# Patient Record
Sex: FEMALE | Race: WHITE | Employment: OTHER | URBAN - METROPOLITAN AREA
[De-identification: names, ages, dates, MRNs, and addresses within clinical notes are randomized per-mention and may not be internally consistent; named-entity substitution may affect disease eponyms.]

---
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
## 2021-11-05 DIAGNOSIS — N89.8 VAGINAL DISCHARGE: ICD-10-CM

## 2021-11-05 DIAGNOSIS — Z11.59 NEED FOR HEPATITIS C SCREENING TEST: ICD-10-CM

## 2021-11-05 DIAGNOSIS — R10.13 EPIGASTRIC PAIN: ICD-10-CM

## 2021-11-05 DIAGNOSIS — Z11.4 ENCOUNTER FOR SCREENING FOR HIV: ICD-10-CM

## 2021-11-05 DIAGNOSIS — R10.9 ABDOMINAL PAIN, UNSPECIFIED ABDOMINAL LOCATION: ICD-10-CM

## 2021-11-05 LAB
ALBUMIN SERPL-MCNC: 5.2 G/DL (ref 3.5–4.6)
ALP BLD-CCNC: 83 U/L (ref 40–130)
ALT SERPL-CCNC: 9 U/L (ref 0–33)
ANION GAP SERPL CALCULATED.3IONS-SCNC: 11 MEQ/L (ref 9–15)
AST SERPL-CCNC: 21 U/L (ref 0–35)
BASOPHILS ABSOLUTE: 0 K/UL (ref 0–0.2)
BASOPHILS RELATIVE PERCENT: 0.6 %
BILIRUB SERPL-MCNC: 1 MG/DL (ref 0.2–0.7)
BUN BLDV-MCNC: 9 MG/DL (ref 6–20)
CALCIUM SERPL-MCNC: 9.4 MG/DL (ref 8.5–9.9)
CHLORIDE BLD-SCNC: 101 MEQ/L (ref 95–107)
CLUE CELLS: ABNORMAL
CO2: 26 MEQ/L (ref 20–31)
CREAT SERPL-MCNC: 0.64 MG/DL (ref 0.5–0.9)
EOSINOPHILS ABSOLUTE: 0.1 K/UL (ref 0–0.7)
EOSINOPHILS RELATIVE PERCENT: 1 %
GFR AFRICAN AMERICAN: >60
GFR NON-AFRICAN AMERICAN: >60
GLOBULIN: 2.8 G/DL (ref 2.3–3.5)
GLUCOSE BLD-MCNC: 89 MG/DL (ref 70–99)
HCT VFR BLD CALC: 44.4 % (ref 37–47)
HEMOGLOBIN: 14.8 G/DL (ref 12–16)
HEPATITIS C ANTIBODY INTERPRETATION: NORMAL
LIPASE: 35 U/L (ref 12–95)
LYMPHOCYTES ABSOLUTE: 1.9 K/UL (ref 1–4.8)
LYMPHOCYTES RELATIVE PERCENT: 28.4 %
MCH RBC QN AUTO: 31.1 PG (ref 27–31.3)
MCHC RBC AUTO-ENTMCNC: 33.2 % (ref 33–37)
MCV RBC AUTO: 93.6 FL (ref 82–100)
MONOCYTES ABSOLUTE: 0.5 K/UL (ref 0.2–0.8)
MONOCYTES RELATIVE PERCENT: 8 %
NEUTROPHILS ABSOLUTE: 4.2 K/UL (ref 1.4–6.5)
NEUTROPHILS RELATIVE PERCENT: 62 %
PDW BLD-RTO: 13.2 % (ref 11.5–14.5)
PLATELET # BLD: 305 K/UL (ref 130–400)
POTASSIUM SERPL-SCNC: 3.8 MEQ/L (ref 3.4–4.9)
RBC # BLD: 4.74 M/UL (ref 4.2–5.4)
SODIUM BLD-SCNC: 138 MEQ/L (ref 135–144)
TOTAL PROTEIN: 8 G/DL (ref 6.3–8)
TRICHOMONAS PREP: ABNORMAL
TRICHOMONAS VAGINALIS SCREEN: NEGATIVE
WBC # BLD: 6.7 K/UL (ref 4.5–11)
YEAST WET PREP: ABNORMAL

## 2021-11-09 LAB — HIV AG/AB: NONREACTIVE

## 2023-05-15 SDOH — ECONOMIC STABILITY: TRANSPORTATION INSECURITY
IN THE PAST 12 MONTHS, HAS LACK OF TRANSPORTATION KEPT YOU FROM MEETINGS, WORK, OR FROM GETTING THINGS NEEDED FOR DAILY LIVING?: PATIENT DECLINED

## 2023-05-15 SDOH — ECONOMIC STABILITY: INCOME INSECURITY: HOW HARD IS IT FOR YOU TO PAY FOR THE VERY BASICS LIKE FOOD, HOUSING, MEDICAL CARE, AND HEATING?: PATIENT DECLINED

## 2023-05-15 SDOH — ECONOMIC STABILITY: FOOD INSECURITY: WITHIN THE PAST 12 MONTHS, THE FOOD YOU BOUGHT JUST DIDN'T LAST AND YOU DIDN'T HAVE MONEY TO GET MORE.: PATIENT DECLINED

## 2023-05-15 SDOH — ECONOMIC STABILITY: HOUSING INSECURITY
IN THE LAST 12 MONTHS, WAS THERE A TIME WHEN YOU DID NOT HAVE A STEADY PLACE TO SLEEP OR SLEPT IN A SHELTER (INCLUDING NOW)?: PATIENT REFUSED

## 2023-05-15 SDOH — ECONOMIC STABILITY: FOOD INSECURITY: WITHIN THE PAST 12 MONTHS, YOU WORRIED THAT YOUR FOOD WOULD RUN OUT BEFORE YOU GOT MONEY TO BUY MORE.: PATIENT DECLINED

## 2023-05-16 ENCOUNTER — OFFICE VISIT (OUTPATIENT)
Dept: FAMILY MEDICINE CLINIC | Age: 23
End: 2023-05-16
Payer: COMMERCIAL

## 2023-05-16 VITALS
WEIGHT: 122 LBS | HEIGHT: 68 IN | HEART RATE: 76 BPM | DIASTOLIC BLOOD PRESSURE: 60 MMHG | BODY MASS INDEX: 18.49 KG/M2 | SYSTOLIC BLOOD PRESSURE: 120 MMHG

## 2023-05-16 DIAGNOSIS — F90.2 ATTENTION DEFICIT HYPERACTIVITY DISORDER (ADHD), COMBINED TYPE: Primary | ICD-10-CM

## 2023-05-16 DIAGNOSIS — Z20.828 EXPOSURE TO HERPES SIMPLEX VIRUS (HSV): ICD-10-CM

## 2023-05-16 DIAGNOSIS — F43.10 PTSD (POST-TRAUMATIC STRESS DISORDER): ICD-10-CM

## 2023-05-16 DIAGNOSIS — F41.9 ANXIETY: ICD-10-CM

## 2023-05-16 PROCEDURE — 99214 OFFICE O/P EST MOD 30 MIN: CPT | Performed by: NURSE PRACTITIONER

## 2023-05-16 RX ORDER — METHYLPHENIDATE HYDROCHLORIDE 18 MG/1
18 TABLET ORAL DAILY
Qty: 30 TABLET | Refills: 0 | Status: SHIPPED | OUTPATIENT
Start: 2023-05-16 | End: 2023-06-15

## 2023-05-16 RX ORDER — METHYLPHENIDATE HYDROCHLORIDE 18 MG/1
18 TABLET ORAL DAILY
Qty: 30 TABLET | Refills: 0 | Status: SHIPPED | OUTPATIENT
Start: 2023-06-15 | End: 2023-07-15

## 2023-05-16 RX ORDER — ESCITALOPRAM OXALATE 10 MG/1
10 TABLET ORAL DAILY
Qty: 30 TABLET | Refills: 5 | Status: SHIPPED | OUTPATIENT
Start: 2023-05-16

## 2023-05-16 RX ORDER — PRAZOSIN HYDROCHLORIDE 1 MG/1
1 CAPSULE ORAL NIGHTLY
Qty: 30 CAPSULE | Refills: 3 | Status: SHIPPED | OUTPATIENT
Start: 2023-05-16

## 2023-05-16 ASSESSMENT — PATIENT HEALTH QUESTIONNAIRE - PHQ9
7. TROUBLE CONCENTRATING ON THINGS, SUCH AS READING THE NEWSPAPER OR WATCHING TELEVISION: 0
SUM OF ALL RESPONSES TO PHQ QUESTIONS 1-9: 4
9. THOUGHTS THAT YOU WOULD BE BETTER OFF DEAD, OR OF HURTING YOURSELF: 0
2. FEELING DOWN, DEPRESSED OR HOPELESS: 1
10. IF YOU CHECKED OFF ANY PROBLEMS, HOW DIFFICULT HAVE THESE PROBLEMS MADE IT FOR YOU TO DO YOUR WORK, TAKE CARE OF THINGS AT HOME, OR GET ALONG WITH OTHER PEOPLE: 0
4. FEELING TIRED OR HAVING LITTLE ENERGY: 1
8. MOVING OR SPEAKING SO SLOWLY THAT OTHER PEOPLE COULD HAVE NOTICED. OR THE OPPOSITE, BEING SO FIGETY OR RESTLESS THAT YOU HAVE BEEN MOVING AROUND A LOT MORE THAN USUAL: 0
1. LITTLE INTEREST OR PLEASURE IN DOING THINGS: 1
6. FEELING BAD ABOUT YOURSELF - OR THAT YOU ARE A FAILURE OR HAVE LET YOURSELF OR YOUR FAMILY DOWN: 0
SUM OF ALL RESPONSES TO PHQ QUESTIONS 1-9: 4
3. TROUBLE FALLING OR STAYING ASLEEP: 1
SUM OF ALL RESPONSES TO PHQ9 QUESTIONS 1 & 2: 2
SUM OF ALL RESPONSES TO PHQ QUESTIONS 1-9: 4
SUM OF ALL RESPONSES TO PHQ QUESTIONS 1-9: 4
5. POOR APPETITE OR OVEREATING: 0

## 2023-05-16 ASSESSMENT — ENCOUNTER SYMPTOMS
FEELING OF CHOKING: 0
VOMITING: 0
ANAL BLEEDING: 0
COUGH: 0
COLOR CHANGE: 0
EYES NEGATIVE: 1
HYPERVENTILATION: 0
ABDOMINAL PAIN: 0
ALLERGIC/IMMUNOLOGIC NEGATIVE: 1
SWOLLEN GLANDS: 0
SORE THROAT: 0
RESPIRATORY NEGATIVE: 1
TROUBLE SWALLOWING: 0
GASTROINTESTINAL NEGATIVE: 1
CONSTIPATION: 0
RECTAL PAIN: 0
CHANGE IN BOWEL HABIT: 0
DIARRHEA: 0
VOICE CHANGE: 0
BLOOD IN STOOL: 0
VISUAL CHANGE: 0
NAUSEA: 0
SHORTNESS OF BREATH: 0

## 2023-05-16 NOTE — PROGRESS NOTES
Subjective  Ramona Young, 25 y.o. female presents today with:  Chief Complaint   Patient presents with    Check-Up    ADHD        Went to urgent care for STD screen and HSV blood draw indicated she carries both type 1 and 2 she has never had any HSV break outs-  would like labs redrawn    ADHD  This is a new (Diagnosis by psych at [facility]-  see Media for verification) problem. The current episode started more than 1 year ago. The problem occurs constantly. The problem has been unchanged. Pertinent negatives include no abdominal pain, anorexia, arthralgias, change in bowel habit, chest pain, chills, congestion, coughing, diaphoresis, fatigue, fever, headaches, joint swelling, myalgias, nausea, neck pain, numbness, rash, sore throat, swollen glands, urinary symptoms, vertigo, visual change, vomiting or weakness. Nothing aggravates the symptoms. She has tried nothing (CBT - still going but psychologist has suggested satarting medication) for the symptoms. Anxiety  Presents for follow-up visit. Symptoms include decreased concentration, excessive worry, nervous/anxious behavior, obsessions and panic. Patient reports no chest pain, compulsions, confusion, depressed mood, dizziness, dry mouth, feeling of choking, hyperventilation, impotence, insomnia, irritability, malaise, muscle tension, nausea, palpitations, restlessness, shortness of breath or suicidal ideas. Primary symptoms comment: nightmares obsessive thoughts- she does have triggers and PTSD. Symptoms occur most days. The quality of sleep is fair. Nighttime awakenings: depends on the night. Compliance with medications is % (lexapro 5 mg will increase to 10mg). Review of Systems   Constitutional: Negative. Negative for activity change, appetite change, chills, diaphoresis, fatigue, fever, irritability and unexpected weight change. HENT: Negative.   Negative for congestion, dental problem, nosebleeds, sore throat, trouble swallowing and

## 2023-05-18 LAB
HSV1+2 IGG SER IA-ACNC: 0.73 IV
HSV1+2 IGM SER IA-ACNC: 1.41 IV

## 2023-06-26 DIAGNOSIS — F90.2 ATTENTION DEFICIT HYPERACTIVITY DISORDER (ADHD), COMBINED TYPE: ICD-10-CM

## 2023-06-27 RX ORDER — METHYLPHENIDATE HYDROCHLORIDE 18 MG/1
18 TABLET ORAL DAILY
Qty: 30 TABLET | Refills: 0 | Status: SHIPPED | OUTPATIENT
Start: 2023-06-27 | End: 2023-07-27

## 2023-07-10 ENCOUNTER — TELEPHONE (OUTPATIENT)
Dept: OBGYN CLINIC | Age: 23
End: 2023-07-10

## 2023-07-10 NOTE — TELEPHONE ENCOUNTER
Pt calling to schedule IUD removal and replacement; however, she does not have one currently in the office. Pt made aware we can order the dionisio but we need the number to her contraceptive card for the order form before faxing.

## 2023-07-11 ENCOUNTER — OFFICE VISIT (OUTPATIENT)
Dept: FAMILY MEDICINE CLINIC | Age: 23
End: 2023-07-11
Payer: COMMERCIAL

## 2023-07-11 VITALS
BODY MASS INDEX: 19.1 KG/M2 | HEIGHT: 68 IN | DIASTOLIC BLOOD PRESSURE: 70 MMHG | HEART RATE: 74 BPM | WEIGHT: 126 LBS | SYSTOLIC BLOOD PRESSURE: 102 MMHG

## 2023-07-11 DIAGNOSIS — F43.10 PTSD (POST-TRAUMATIC STRESS DISORDER): Primary | ICD-10-CM

## 2023-07-11 DIAGNOSIS — F90.2 ATTENTION DEFICIT HYPERACTIVITY DISORDER (ADHD), COMBINED TYPE: ICD-10-CM

## 2023-07-11 DIAGNOSIS — F41.9 ANXIETY: ICD-10-CM

## 2023-07-11 PROCEDURE — 99214 OFFICE O/P EST MOD 30 MIN: CPT | Performed by: NURSE PRACTITIONER

## 2023-07-11 RX ORDER — ATOMOXETINE 40 MG/1
40 CAPSULE ORAL DAILY
Qty: 7 CAPSULE | Refills: 0 | Status: SHIPPED | OUTPATIENT
Start: 2023-09-09 | End: 2023-09-16

## 2023-07-11 RX ORDER — PRAZOSIN HYDROCHLORIDE 2 MG/1
2 CAPSULE ORAL NIGHTLY
Qty: 30 CAPSULE | Refills: 3 | Status: SHIPPED | OUTPATIENT
Start: 2023-07-11

## 2023-07-11 RX ORDER — ESCITALOPRAM OXALATE 10 MG/1
10 TABLET ORAL DAILY
Qty: 30 TABLET | Refills: 5 | Status: SHIPPED | OUTPATIENT
Start: 2023-07-11

## 2023-07-11 RX ORDER — ATOMOXETINE 80 MG/1
80 CAPSULE ORAL DAILY
Qty: 30 CAPSULE | Refills: 2 | Status: SHIPPED | OUTPATIENT
Start: 2023-09-09 | End: 2023-10-09

## 2023-07-11 ASSESSMENT — ENCOUNTER SYMPTOMS
ABDOMINAL PAIN: 0
TROUBLE SWALLOWING: 0
RECTAL PAIN: 0
CHANGE IN BOWEL HABIT: 0
COUGH: 0
RESPIRATORY NEGATIVE: 1
SORE THROAT: 0
HYPERVENTILATION: 0
VISUAL CHANGE: 0
BLOOD IN STOOL: 0
SHORTNESS OF BREATH: 0
VOMITING: 0
DIARRHEA: 0
GASTROINTESTINAL NEGATIVE: 1
ALLERGIC/IMMUNOLOGIC NEGATIVE: 1
FEELING OF CHOKING: 0
EYES NEGATIVE: 1
SWOLLEN GLANDS: 0
CONSTIPATION: 0
NAUSEA: 0
COLOR CHANGE: 0
VOICE CHANGE: 0
ANAL BLEEDING: 0

## 2023-07-11 NOTE — PROGRESS NOTES
Subjective  Israel Lundberg, 25 y.o. female presents today with:  Chief Complaint   Patient presents with    ADHD    Insomnia    Discuss Medications    Discuss Labs        ADHD  This is a new (Diagnosis by psych at [facility]-  see Media for verification) problem. The current episode started more than 1 year ago. The problem occurs constantly. The problem has been unchanged. Pertinent negatives include no abdominal pain, anorexia, arthralgias, change in bowel habit, chest pain, chills, congestion, coughing, diaphoresis, fatigue, fever, headaches, joint swelling, myalgias, nausea, neck pain, numbness, rash, sore throat, swollen glands, urinary symptoms, vertigo, visual change, vomiting or weakness. Nothing aggravates the symptoms. She has tried nothing (CBT - still going but psychologist has suggested satarting medication) for the symptoms. Anxiety  Presents for follow-up visit. Symptoms include decreased concentration, excessive worry, insomnia, nervous/anxious behavior, obsessions and panic. Patient reports no chest pain, compulsions, confusion, depressed mood, dizziness, dry mouth, feeling of choking, hyperventilation, impotence, irritability, malaise, muscle tension, nausea, palpitations, restlessness, shortness of breath or suicidal ideas. Primary symptoms comment: nightmares obsessive thoughts- she does have triggers and PTSD. Symptoms occur most days. The quality of sleep is fair. Nighttime awakenings: depends on the night. Compliance with medications is % (lexapro 5 mg will increase to 10mg). Review of Systems   Constitutional: Negative. Negative for activity change, appetite change, chills, diaphoresis, fatigue, fever, irritability and unexpected weight change. HENT: Negative. Negative for congestion, dental problem, nosebleeds, sore throat, trouble swallowing and voice change. Eyes: Negative. Negative for visual disturbance. Respiratory: Negative.   Negative for cough and shortness

## 2023-07-31 ENCOUNTER — TELEPHONE (OUTPATIENT)
Dept: FAMILY MEDICINE CLINIC | Age: 23
End: 2023-07-31

## 2023-07-31 DIAGNOSIS — F90.2 ATTENTION DEFICIT HYPERACTIVITY DISORDER (ADHD), COMBINED TYPE: ICD-10-CM

## 2023-07-31 RX ORDER — ATOMOXETINE 80 MG/1
80 CAPSULE ORAL DAILY
Qty: 30 CAPSULE | Refills: 2 | Status: SHIPPED | OUTPATIENT
Start: 2023-09-09 | End: 2023-10-09

## 2023-07-31 RX ORDER — ATOMOXETINE 40 MG/1
40 CAPSULE ORAL DAILY
Qty: 7 CAPSULE | Refills: 0 | Status: SHIPPED | OUTPATIENT
Start: 2023-09-09 | End: 2023-09-16

## 2023-07-31 NOTE — TELEPHONE ENCOUNTER
Tell him or her,  sorry for some reason I sent it to [facility].   I just sent it to Mag[facility] P[facility]

## 2023-07-31 NOTE — TELEPHONE ENCOUNTER
(not on HIPAA) called because patient thought an ADHD medication was being sent in to [facility] on [address]. There is nothing at the pharmacy. (I let the  know we would contact Jessy)    Please advise, thank you.

## 2023-08-02 DIAGNOSIS — F43.10 PTSD (POST-TRAUMATIC STRESS DISORDER): ICD-10-CM

## 2023-08-02 DIAGNOSIS — F90.2 ATTENTION DEFICIT HYPERACTIVITY DISORDER (ADHD), COMBINED TYPE: ICD-10-CM

## 2023-08-02 DIAGNOSIS — F41.9 ANXIETY: ICD-10-CM

## 2023-08-02 NOTE — TELEPHONE ENCOUNTER
patient requesting medication refill.  Please approve or deny this request.    Rx requested:  Requested Prescriptions     Pending Prescriptions Disp Refills    atomoxetine (STRATTERA) 80 MG capsule 30 capsule 2     Sig: Take 1 capsule by mouth daily    escitalopram (LEXAPRO) 10 MG tablet 30 tablet 5     Sig: Take 1 tablet by mouth daily    prazosin (MINIPRESS) 2 MG capsule 30 capsule 3     Sig: Take 1 capsule by mouth nightly         Last Office Visit:   7/11/2023      Next Visit Date:  Future Appointments   Date Time Provider [address] [place] [address]   9/5/2023  3:00 PM Rebecca Johnson [address] [place] [address]

## 2023-08-04 RX ORDER — ATOMOXETINE 80 MG/1
80 CAPSULE ORAL DAILY
Qty: 30 CAPSULE | Refills: 2 | OUTPATIENT
Start: 2023-09-09 | End: 2023-12-08

## 2023-08-04 RX ORDER — PRAZOSIN HYDROCHLORIDE 2 MG/1
2 CAPSULE ORAL NIGHTLY
Qty: 30 CAPSULE | Refills: 5 | Status: SHIPPED | OUTPATIENT
Start: 2023-08-04 | End: 2023-09-05 | Stop reason: SDUPTHER

## 2023-08-04 RX ORDER — ESCITALOPRAM OXALATE 10 MG/1
10 TABLET ORAL DAILY
Qty: 30 TABLET | Refills: 5 | Status: SHIPPED | OUTPATIENT
Start: 2023-08-04 | End: 2023-09-05 | Stop reason: SDUPTHER

## 2023-08-15 DIAGNOSIS — F90.2 ATTENTION DEFICIT HYPERACTIVITY DISORDER (ADHD), COMBINED TYPE: ICD-10-CM

## 2023-08-15 RX ORDER — ATOMOXETINE 40 MG/1
40 CAPSULE ORAL DAILY
Qty: 7 CAPSULE | Refills: 0 | Status: SHIPPED | OUTPATIENT
Start: 2023-08-15 | End: 2023-08-22

## 2023-08-15 RX ORDER — ATOMOXETINE 80 MG/1
80 CAPSULE ORAL DAILY
Qty: 30 CAPSULE | Refills: 2 | Status: SHIPPED | OUTPATIENT
Start: 2023-08-15 | End: 2023-09-14

## 2023-08-15 RX ORDER — ATOMOXETINE 40 MG/1
40 CAPSULE ORAL DAILY
Qty: 7 CAPSULE | Refills: 0 | Status: SHIPPED | OUTPATIENT
Start: 2023-08-15 | End: 2023-08-15 | Stop reason: SDUPTHER

## 2023-08-15 RX ORDER — ATOMOXETINE 80 MG/1
80 CAPSULE ORAL DAILY
Qty: 30 CAPSULE | Refills: 2 | Status: SHIPPED | OUTPATIENT
Start: 2023-08-15 | End: 2023-08-15 | Stop reason: SDUPTHER

## 2023-09-05 ENCOUNTER — OFFICE VISIT (OUTPATIENT)
Dept: FAMILY MEDICINE CLINIC | Age: 23
End: 2023-09-05
Payer: COMMERCIAL

## 2023-09-05 VITALS
DIASTOLIC BLOOD PRESSURE: 70 MMHG | BODY MASS INDEX: 19.1 KG/M2 | WEIGHT: 126 LBS | HEART RATE: 74 BPM | HEIGHT: 68 IN | SYSTOLIC BLOOD PRESSURE: 112 MMHG

## 2023-09-05 DIAGNOSIS — F90.2 ATTENTION DEFICIT HYPERACTIVITY DISORDER (ADHD), COMBINED TYPE: Primary | ICD-10-CM

## 2023-09-05 DIAGNOSIS — F41.9 ANXIETY: ICD-10-CM

## 2023-09-05 DIAGNOSIS — J30.89 ENVIRONMENTAL AND SEASONAL ALLERGIES: ICD-10-CM

## 2023-09-05 DIAGNOSIS — F43.10 PTSD (POST-TRAUMATIC STRESS DISORDER): ICD-10-CM

## 2023-09-05 PROCEDURE — 99214 OFFICE O/P EST MOD 30 MIN: CPT | Performed by: NURSE PRACTITIONER

## 2023-09-05 RX ORDER — ATOMOXETINE 80 MG/1
80 CAPSULE ORAL DAILY
Qty: 30 CAPSULE | Refills: 2 | Status: CANCELLED | OUTPATIENT
Start: 2023-09-05 | End: 2023-10-05

## 2023-09-05 RX ORDER — ATOMOXETINE 80 MG/1
80 CAPSULE ORAL DAILY
Qty: 30 CAPSULE | Refills: 0 | Status: SHIPPED | OUTPATIENT
Start: 2023-09-05 | End: 2023-10-05

## 2023-09-05 RX ORDER — ESCITALOPRAM OXALATE 10 MG/1
10 TABLET ORAL DAILY
Qty: 90 TABLET | Refills: 3 | Status: SHIPPED | OUTPATIENT
Start: 2023-09-05

## 2023-09-05 RX ORDER — PRAZOSIN HYDROCHLORIDE 2 MG/1
2 CAPSULE ORAL NIGHTLY
Qty: 90 CAPSULE | Refills: 3 | Status: SHIPPED | OUTPATIENT
Start: 2023-09-05

## 2023-09-11 ASSESSMENT — ENCOUNTER SYMPTOMS
ANAL BLEEDING: 0
RESPIRATORY NEGATIVE: 1
VOMITING: 0
TROUBLE SWALLOWING: 0
VISUAL CHANGE: 0
SWOLLEN GLANDS: 0
NAUSEA: 0
ALLERGIC/IMMUNOLOGIC NEGATIVE: 1
EYES NEGATIVE: 1
ABDOMINAL PAIN: 0
HYPERVENTILATION: 0
RECTAL PAIN: 0
CONSTIPATION: 0
FEELING OF CHOKING: 0
GASTROINTESTINAL NEGATIVE: 1
SORE THROAT: 0
BLOOD IN STOOL: 0
VOICE CHANGE: 0
SHORTNESS OF BREATH: 0
DIARRHEA: 0
CHANGE IN BOWEL HABIT: 0
COUGH: 0
COLOR CHANGE: 0

## 2023-09-13 ENCOUNTER — PATIENT MESSAGE (OUTPATIENT)
Dept: FAMILY MEDICINE CLINIC | Age: 23
End: 2023-09-13

## 2023-11-02 PROBLEM — H92.10 OTORRHEA: Status: ACTIVE | Noted: 2023-11-02

## 2023-11-15 ENCOUNTER — TELEMEDICINE (OUTPATIENT)
Dept: OBGYN CLINIC | Age: 23
End: 2023-11-15
Payer: COMMERCIAL

## 2023-11-15 DIAGNOSIS — N94.6 DYSMENORRHEA: ICD-10-CM

## 2023-11-15 DIAGNOSIS — Z30.014 ENCOUNTER FOR INITIAL PRESCRIPTION OF INTRAUTERINE CONTRACEPTIVE DEVICE (IUD): ICD-10-CM

## 2023-11-15 DIAGNOSIS — N94.6 DYSMENORRHEA: Primary | ICD-10-CM

## 2023-11-15 DIAGNOSIS — Z30.011 ENCOUNTER FOR INITIAL PRESCRIPTION OF CONTRACEPTIVE PILLS: ICD-10-CM

## 2023-11-15 PROCEDURE — 99214 OFFICE O/P EST MOD 30 MIN: CPT | Performed by: ADVANCED PRACTICE MIDWIFE

## 2023-11-15 RX ORDER — MISOPROSTOL 200 UG/1
TABLET ORAL
Qty: 4 TABLET | Refills: 0 | Status: SHIPPED | OUTPATIENT
Start: 2023-11-15

## 2023-11-15 RX ORDER — DROSPIRENONE AND ETHINYL ESTRADIOL 0.02-3(28)
1 KIT ORAL DAILY
Qty: 28 TABLET | Refills: 2 | Status: SHIPPED | OUTPATIENT
Start: 2023-11-15 | End: 2023-11-16

## 2023-11-15 RX ORDER — IBUPROFEN 800 MG/1
TABLET ORAL
Qty: 15 TABLET | Refills: 0 | Status: SHIPPED | OUTPATIENT
Start: 2023-11-15

## 2023-11-15 ASSESSMENT — ENCOUNTER SYMPTOMS
CONSTIPATION: 0
VOMITING: 0
SHORTNESS OF BREATH: 0
DIARRHEA: 0
ABDOMINAL PAIN: 0
NAUSEA: 0
COUGH: 0

## 2023-11-15 NOTE — PROGRESS NOTES
Constance Pelaez (:  2000) is a Established patient, here for evaluation of the following:    Chief Complaint   Patient presents with    Follow-up     Would like to discuss getting a mirena to replace dionisio         Assessment & Plan   Below is the assessment and plan developed based on review of pertinent history, physical exam, labs, studies, and medications:    1. Dysmenorrhea  -     drospirenone-ethinyl estradiol (BRITTANY) 3-0.02 MG per tablet; Take 1 tablet by mouth daily, Disp-28 tablet, R-2Normal  2. Encounter for initial prescription of intrauterine contraceptive device (IUD)  -     miSOPROStol (CYTOTEC) 200 MCG tablet; Take 2 tablets at bedtime with ibuprofen the night before your IUD insertion. Then take 2 tablets with ibuprofen 1-2 hours before your appointment for IUD insertion. , Disp-4 tablet, R-0Normal  -     ibuprofen (ADVIL;MOTRIN) 800 MG tablet; Take 1 tablet with misoprostol (Cytotec) the night before your IUD insertion. Take 1 tablet with misoprostol 1-2 hours before your appointment for IUD insertion. Then take every 8 hours as needed to relieve cramping., Disp-15 tablet, R-0Normal  3. Encounter for initial prescription of contraceptive pills      Return for IUD Placement. Subjective   Dysmenorrhea  Utilizing hormonal contraception to relieve uncomfortable menstrual symptoms. Dysmenorrhea symptoms have been occurring for greater than 1 year. Dionisio IUD placed over 3 years ago, it was due for removal this past . She is happy with her IUD, would like to continue with the same treatment plan, but would like to transition to a long-lasting IUD, such as Mirena. Discussed starting a combined OCP now to control dysmenorrhea symptoms until her IUD can be replaced. Review of Systems   Respiratory:  Negative for cough and shortness of breath. Gastrointestinal:  Negative for abdominal pain, constipation, diarrhea, nausea and vomiting.    Genitourinary:  Positive for menstrual

## 2023-11-16 DIAGNOSIS — Z30.014 ENCOUNTER FOR INITIAL PRESCRIPTION OF INTRAUTERINE CONTRACEPTIVE DEVICE (IUD): ICD-10-CM

## 2023-11-16 RX ORDER — DROSPIRENONE AND ETHINYL ESTRADIOL 0.02-3(28)
1 KIT ORAL DAILY
Qty: 84 TABLET | Refills: 1 | Status: SHIPPED | OUTPATIENT
Start: 2023-11-16 | End: 2024-02-14

## 2023-11-16 RX ORDER — MISOPROSTOL 200 UG/1
TABLET ORAL
Qty: 4 TABLET | Refills: 0 | OUTPATIENT
Start: 2023-11-16

## 2023-12-12 ENCOUNTER — TELEPHONE (OUTPATIENT)
Dept: OBGYN CLINIC | Age: 23
End: 2023-12-12

## 2023-12-13 NOTE — TELEPHONE ENCOUNTER
Pt should have a 90 day supply of OCP . She should not need a refill yet.   If so, she needs to contact her pharmacy about not giving her the 90 day supply.

## 2024-01-11 ENCOUNTER — PROCEDURE VISIT (OUTPATIENT)
Dept: OBGYN CLINIC | Age: 24
End: 2024-01-11

## 2024-01-11 VITALS
WEIGHT: 122 LBS | BODY MASS INDEX: 18.49 KG/M2 | SYSTOLIC BLOOD PRESSURE: 122 MMHG | HEIGHT: 68 IN | DIASTOLIC BLOOD PRESSURE: 78 MMHG

## 2024-01-11 DIAGNOSIS — Z11.51 SCREENING FOR HUMAN PAPILLOMAVIRUS: ICD-10-CM

## 2024-01-11 DIAGNOSIS — Z32.02 URINE PREGNANCY TEST NEGATIVE: ICD-10-CM

## 2024-01-11 DIAGNOSIS — Z01.419 WOMEN'S ANNUAL ROUTINE GYNECOLOGICAL EXAMINATION: Primary | ICD-10-CM

## 2024-01-11 DIAGNOSIS — Z30.433 ENCOUNTER FOR IUD REMOVAL AND REINSERTION: ICD-10-CM

## 2024-01-11 DIAGNOSIS — N94.6 DYSMENORRHEA: ICD-10-CM

## 2024-01-11 RX ORDER — CYCLOBENZAPRINE HCL 10 MG
10 TABLET ORAL 3 TIMES DAILY PRN
Qty: 40 TABLET | Refills: 1 | Status: SHIPPED | OUTPATIENT
Start: 2024-01-11 | End: 2024-03-11

## 2024-01-11 ASSESSMENT — ENCOUNTER SYMPTOMS
COUGH: 0
TROUBLE SWALLOWING: 0
SHORTNESS OF BREATH: 0
CONSTIPATION: 0
RHINORRHEA: 0
SORE THROAT: 0
VOMITING: 0
DIARRHEA: 0
ABDOMINAL PAIN: 0
VOICE CHANGE: 0
NAUSEA: 0

## 2024-01-11 ASSESSMENT — PATIENT HEALTH QUESTIONNAIRE - PHQ9: DEPRESSION UNABLE TO ASSESS: PT REFUSES

## 2024-01-11 NOTE — PROGRESS NOTES
IUD Removal and New Insertion Procedure Note    Pre-operative Diagnosis:  Dysmenorrhea, IUD at completion of therapy    Post-operative Diagnosis:  Same    Indications:  Same    Procedure Details:   Urine pregnancy test was done and result was negative.  The risks (including infection, bleeding, pain, and uterine perforation) and benefits of the procedure were explained to the patient and Written informed consent was obtained.      A Time Out was performed with patient participation prior to beginning the procedure.    The cervix was visualized, IUD strings.  The existing IUD strings were grasped with ring forceps, the IUD was removed intact, and shown to the patient for confirmation that it had been removed and was intact.      The cervix was then cleansed with Betadine. Uterus sounded to 7 cm. Julia IUD inserted without difficulty. Strings visible and trimmed.     Removal and new insertion procedures were tolerated well.      IUD Information:    Type:  Julia  LOT Number:  [id number]  Expiration Date:  04/30/2025  NDC:  04909-101-70    Condition:  Stable    Complications:  None    Plan:  The patient was advised to call for any fever or for prolonged or severe pain or bleeding. She was advised to use OTC Ibuprofen, Tylenol, or Flexeril as needed for mild to moderate pain and/or cramping.    Follow up:  Return in about 4 weeks (around 2/8/2024) for IUD Follow-up, (virtual visit).    MACKENZIE Myers CNM

## 2024-01-11 NOTE — PROGRESS NOTES
Chief Complaint:     Jessy Arnett is a 23 y.o. female who presents here today for complaints of:      Chief Complaint   Patient presents with    Procedure     Julia removal and replacement      History of Present Illness:     Jesys Arnett is a 23 y.o. female who presents for her annual exam.      Concerns Today:    None, doing well.  Here for IUD removal and reinsertion.    Past Medical History:     Allergies:  Macadamia nut oil and [place] oil  No LMP recorded. (Menstrual status: IUD).  Obstetrical History:       No past medical history on file.  Medications:     Current Outpatient Medications on File Prior to Visit   Medication Sig Dispense Refill    escitalopram (LEXAPRO) 10 MG tablet Take 1 tablet by mouth daily 90 tablet 3    drospirenone-ethinyl estradiol (BRITTANY) 3-0.02 MG per tablet TAKE 1 TABLET BY MOUTH DAILY (Patient not taking: Reported on 2024) 84 tablet 1    miSOPROStol (CYTOTEC) 200 MCG tablet Take 2 tablets at bedtime with ibuprofen the night before your IUD insertion.  Then take 2 tablets with ibuprofen 1-2 hours before your appointment for IUD insertion. 4 tablet 0    ibuprofen (ADVIL;MOTRIN) 800 MG tablet Take 1 tablet with misoprostol (Cytotec) the night before your IUD insertion.  Take 1 tablet with misoprostol 1-2 hours before your appointment for IUD insertion.  Then take every 8 hours as needed to relieve cramping. 15 tablet 0    prazosin (MINIPRESS) 2 MG capsule Take 1 capsule by mouth nightly (Patient not taking: Reported on 2024) 90 capsule 3    atomoxetine (STRATTERA) 80 MG capsule Take 1 capsule by mouth daily 30 capsule 0     No current facility-administered medications on file prior to visit.     Review of Systems:     Review of Systems   Constitutional:  Negative for activity change, appetite change, chills, diaphoresis, fatigue, fever and unexpected weight change.   HENT:  Negative for congestion, postnasal drip, rhinorrhea, sneezing, sore throat, trouble

## 2024-01-16 LAB
HPV HR 12 DNA SPEC QL NAA+PROBE: NOT DETECTED
HPV16 DNA SPEC QL NAA+PROBE: NOT DETECTED
HPV16+18+H RISK 12 DNA SPEC-IMP: NORMAL
HPV18 DNA SPEC QL NAA+PROBE: NOT DETECTED

## 2024-05-20 ENCOUNTER — OFFICE VISIT (OUTPATIENT)
Dept: FAMILY MEDICINE CLINIC | Age: 24
End: 2024-05-20
Payer: COMMERCIAL

## 2024-05-20 VITALS
BODY MASS INDEX: 18.49 KG/M2 | HEIGHT: 68 IN | SYSTOLIC BLOOD PRESSURE: 112 MMHG | DIASTOLIC BLOOD PRESSURE: 60 MMHG | WEIGHT: 122 LBS

## 2024-05-20 DIAGNOSIS — F90.2 ATTENTION DEFICIT HYPERACTIVITY DISORDER (ADHD), COMBINED TYPE: ICD-10-CM

## 2024-05-20 DIAGNOSIS — J30.89 ENVIRONMENTAL AND SEASONAL ALLERGIES: Primary | ICD-10-CM

## 2024-05-20 PROCEDURE — 99214 OFFICE O/P EST MOD 30 MIN: CPT | Performed by: NURSE PRACTITIONER

## 2024-05-20 RX ORDER — MONTELUKAST SODIUM 10 MG/1
10 TABLET ORAL NIGHTLY
Qty: 90 TABLET | Refills: 3 | Status: SHIPPED | OUTPATIENT
Start: 2024-05-20

## 2024-05-20 RX ORDER — TRIAMCINOLONE ACETONIDE 40 MG/ML
40 INJECTION, SUSPENSION INTRA-ARTICULAR; INTRAMUSCULAR ONCE
Status: SHIPPED | OUTPATIENT
Start: 2024-05-20

## 2024-05-20 RX ORDER — DEXTROAMPHETAMINE SACCHARATE, AMPHETAMINE ASPARTATE MONOHYDRATE, DEXTROAMPHETAMINE SULFATE AND AMPHETAMINE SULFATE 2.5; 2.5; 2.5; 2.5 MG/1; MG/1; MG/1; MG/1
10 CAPSULE, EXTENDED RELEASE ORAL DAILY
Qty: 90 CAPSULE | Refills: 0 | Status: SHIPPED | OUTPATIENT
Start: 2024-05-20 | End: 2024-08-18

## 2024-05-20 SDOH — ECONOMIC STABILITY: INCOME INSECURITY: HOW HARD IS IT FOR YOU TO PAY FOR THE VERY BASICS LIKE FOOD, HOUSING, MEDICAL CARE, AND HEATING?: NOT HARD AT ALL

## 2024-05-20 SDOH — ECONOMIC STABILITY: FOOD INSECURITY: WITHIN THE PAST 12 MONTHS, THE FOOD YOU BOUGHT JUST DIDN'T LAST AND YOU DIDN'T HAVE MONEY TO GET MORE.: NEVER TRUE

## 2024-05-20 SDOH — ECONOMIC STABILITY: HOUSING INSECURITY
IN THE LAST 12 MONTHS, WAS THERE A TIME WHEN YOU DID NOT HAVE A STEADY PLACE TO SLEEP OR SLEPT IN A SHELTER (INCLUDING NOW)?: NO

## 2024-05-20 SDOH — ECONOMIC STABILITY: FOOD INSECURITY: WITHIN THE PAST 12 MONTHS, YOU WORRIED THAT YOUR FOOD WOULD RUN OUT BEFORE YOU GOT MONEY TO BUY MORE.: NEVER TRUE

## 2024-05-20 ASSESSMENT — PATIENT HEALTH QUESTIONNAIRE - PHQ9
1. LITTLE INTEREST OR PLEASURE IN DOING THINGS: SEVERAL DAYS
2. FEELING DOWN, DEPRESSED OR HOPELESS: SEVERAL DAYS
SUM OF ALL RESPONSES TO PHQ QUESTIONS 1-9: 2
SUM OF ALL RESPONSES TO PHQ9 QUESTIONS 1 & 2: 2
SUM OF ALL RESPONSES TO PHQ QUESTIONS 1-9: 2

## 2024-05-21 ENCOUNTER — NURSE ONLY (OUTPATIENT)
Dept: FAMILY MEDICINE CLINIC | Age: 24
End: 2024-05-21
Payer: COMMERCIAL

## 2024-05-21 DIAGNOSIS — J30.89 ENVIRONMENTAL AND SEASONAL ALLERGIES: Primary | ICD-10-CM

## 2024-05-21 PROCEDURE — 96372 THER/PROPH/DIAG INJ SC/IM: CPT | Performed by: NURSE PRACTITIONER

## 2024-05-21 RX ORDER — TRIAMCINOLONE ACETONIDE 40 MG/ML
40 INJECTION, SUSPENSION INTRA-ARTICULAR; INTRAMUSCULAR ONCE
Status: COMPLETED | OUTPATIENT
Start: 2024-05-21 | End: 2024-05-21

## 2024-05-21 RX ADMIN — TRIAMCINOLONE ACETONIDE 40 MG: 40 INJECTION, SUSPENSION INTRA-ARTICULAR; INTRAMUSCULAR at 11:40

## 2024-05-28 ASSESSMENT — ENCOUNTER SYMPTOMS
NAUSEA: 0
HYPERVENTILATION: 0
FEELING OF CHOKING: 0
EYES NEGATIVE: 1
RECTAL PAIN: 0
DIARRHEA: 0
ALLERGIC/IMMUNOLOGIC NEGATIVE: 1
CONSTIPATION: 0
ABDOMINAL PAIN: 0
BLOOD IN STOOL: 0
VOMITING: 0
VOICE CHANGE: 0
TROUBLE SWALLOWING: 0
CHANGE IN BOWEL HABIT: 0
ANAL BLEEDING: 0
SHORTNESS OF BREATH: 0
COUGH: 0
SORE THROAT: 0
GASTROINTESTINAL NEGATIVE: 1
VISUAL CHANGE: 0
COLOR CHANGE: 0
RESPIRATORY NEGATIVE: 1
SWOLLEN GLANDS: 0

## 2024-05-29 NOTE — PROGRESS NOTES
subscore is 6.      Motor: No tremor, atrophy, abnormal muscle tone or seizure activity.      Deep Tendon Reflexes: Reflexes are normal and symmetric.   Psychiatric:         Mood and Affect: Mood normal.         Speech: Speech normal.         Behavior: Behavior normal. Behavior is cooperative.     Assessment & Plan   Jessy was seen today for adhd.    Diagnoses and all orders for this visit:    Environmental and seasonal allergies  -     montelukast (SINGULAIR) 10 MG tablet; Take 1 tablet by mouth nightly  -     triamcinolone acetonide (KENALOG-40) injection 40 mg    Attention deficit hyperactivity disorder (ADHD), combined type  -     amphetamine-dextroamphetamine (ADDERALL XR) 10 MG extended release capsule; Take 1 capsule by mouth daily for 90 days. Max Daily Amount: 10 mg      No orders of the defined types were placed in this encounter.      Orders Placed This Encounter   Medications    montelukast (SINGULAIR) 10 MG tablet     Sig: Take 1 tablet by mouth nightly     Dispense:  90 tablet     Refill:  3    amphetamine-dextroamphetamine (ADDERALL XR) 10 MG extended release capsule     Sig: Take 1 capsule by mouth daily for 90 days. Max Daily Amount: 10 mg     Dispense:  90 capsule     Refill:  0    triamcinolone acetonide (KENALOG-40) injection 40 mg     Medications Discontinued During This Encounter   Medication Reason    atomoxetine (STRATTERA) 80 MG capsule LIST CLEANUP    drospirenone-ethinyl estradiol (BRITTANY) 3-0.02 MG per tablet LIST CLEANUP    prazosin (MINIPRESS) 2 MG capsule LIST CLEANUP    miSOPROStol (CYTOTEC) 200 MCG tablet LIST CLEANUP    escitalopram (LEXAPRO) 10 MG tablet LIST CLEANUP    ibuprofen (ADVIL;MOTRIN) 800 MG tablet LIST CLEANUP     Return in about 3 months (around 8/20/2024).      Reviewed with the patient: current clinical status, medications, activities and diet.     Side effects, adverse effects of the medication prescribed today, as well as treatment plan/ rationale and result

## 2024-07-15 ENCOUNTER — PATIENT MESSAGE (OUTPATIENT)
Dept: FAMILY MEDICINE CLINIC | Age: 24
End: 2024-07-15

## 2024-07-23 ENCOUNTER — APPOINTMENT (OUTPATIENT)
Dept: ULTRASOUND IMAGING | Age: 24
End: 2024-07-23
Payer: COMMERCIAL

## 2024-07-23 ENCOUNTER — HOSPITAL ENCOUNTER (EMERGENCY)
Age: 24
Discharge: HOME | End: 2024-07-23
Attending: EMERGENCY MEDICINE
Payer: COMMERCIAL

## 2024-07-23 ENCOUNTER — APPOINTMENT (OUTPATIENT)
Dept: CT IMAGING | Age: 24
End: 2024-07-23
Payer: COMMERCIAL

## 2024-07-23 VITALS
RESPIRATION RATE: 15 BRPM | OXYGEN SATURATION: 98 % | TEMPERATURE: 97.7 F | WEIGHT: 121.91 LBS | BODY MASS INDEX: 18.54 KG/M2 | HEART RATE: 73 BPM | SYSTOLIC BLOOD PRESSURE: 107 MMHG | DIASTOLIC BLOOD PRESSURE: 58 MMHG

## 2024-07-23 DIAGNOSIS — N83.209 RUPTURED OVARIAN CYST: Primary | ICD-10-CM

## 2024-07-23 LAB
ALBUMIN SERPL-MCNC: 5 G/DL (ref 3.5–4.6)
ALP SERPL-CCNC: 68 U/L (ref 40–130)
ALT SERPL-CCNC: 10 U/L (ref 0–33)
ANION GAP SERPL CALCULATED.3IONS-SCNC: 13 MEQ/L (ref 9–15)
AST SERPL-CCNC: 17 U/L (ref 0–35)
BASOPHILS # BLD: 0 K/UL (ref 0–0.2)
BASOPHILS NFR BLD: 0.5 %
BILIRUB SERPL-MCNC: 0.7 MG/DL (ref 0.2–0.7)
BILIRUB UR QL STRIP: NEGATIVE
BUN SERPL-MCNC: 10 MG/DL (ref 6–20)
CALCIUM SERPL-MCNC: 9.4 MG/DL (ref 8.5–9.9)
CHLORIDE SERPL-SCNC: 104 MEQ/L (ref 95–107)
CLARITY UR: CLEAR
CLUE CELLS VAG QL WET PREP: ABNORMAL
CO2 SERPL-SCNC: 26 MEQ/L (ref 20–31)
COLOR UR: YELLOW
CREAT SERPL-MCNC: 0.63 MG/DL (ref 0.5–0.9)
EOSINOPHIL # BLD: 0.1 K/UL (ref 0–0.7)
EOSINOPHIL NFR BLD: 0.8 %
ERYTHROCYTE [DISTWIDTH] IN BLOOD BY AUTOMATED COUNT: 12.2 % (ref 11.5–14.5)
GLOBULIN SER CALC-MCNC: 2.7 G/DL (ref 2.3–3.5)
GLUCOSE SERPL-MCNC: 101 MG/DL (ref 70–99)
GLUCOSE UR STRIP-MCNC: NEGATIVE MG/DL
HCG, URINE, POC: NEGATIVE
HCT VFR BLD AUTO: 43.9 % (ref 37–47)
HGB BLD-MCNC: 14.3 G/DL (ref 12–16)
HGB UR QL STRIP: NEGATIVE
KETONES UR STRIP-MCNC: NEGATIVE MG/DL
LEUKOCYTE ESTERASE UR QL STRIP: NEGATIVE
LYMPHOCYTES # BLD: 2.1 K/UL (ref 1–4.8)
LYMPHOCYTES NFR BLD: 24.3 %
Lab: NORMAL
MCH RBC QN AUTO: 30.7 PG (ref 27–31.3)
MCHC RBC AUTO-ENTMCNC: 32.6 % (ref 33–37)
MCV RBC AUTO: 94.2 FL (ref 79.4–94.8)
MONOCYTES # BLD: 0.7 K/UL (ref 0.2–0.8)
MONOCYTES NFR BLD: 8.1 %
NEGATIVE QC PASS/FAIL: NORMAL
NEUTROPHILS # BLD: 5.7 K/UL (ref 1.4–6.5)
NEUTS SEG NFR BLD: 66 %
NITRITE UR QL STRIP: NEGATIVE
PERFORMED ON: NORMAL
PH UR STRIP: 7.5 [PH] (ref 5–9)
PLATELET # BLD AUTO: 319 K/UL (ref 130–400)
POC CREATININE WHOLE BLOOD: 0.7
POC CREATININE: 0.7 MG/DL (ref 0.6–1.2)
POC SAMPLE TYPE: NORMAL
POSITIVE QC PASS/FAIL: NORMAL
POTASSIUM SERPL-SCNC: 3.8 MEQ/L (ref 3.4–4.9)
PROT SERPL-MCNC: 7.7 G/DL (ref 6.3–8)
PROT UR STRIP-MCNC: NEGATIVE MG/DL
RBC # BLD AUTO: 4.66 M/UL (ref 4.2–5.4)
SODIUM SERPL-SCNC: 143 MEQ/L (ref 135–144)
SP GR UR STRIP: 1 (ref 1–1.03)
T VAGINALIS VAG QL WET PREP: ABNORMAL
TRICHOMONAS VAGINALIS SCREEN: NEGATIVE
URINE REFLEX TO CULTURE: NORMAL
UROBILINOGEN UR STRIP-ACNC: 0.2 E.U./DL
WBC # BLD AUTO: 8.6 K/UL (ref 4.8–10.8)
YEAST VAG QL WET PREP: ABNORMAL

## 2024-07-23 PROCEDURE — 96375 TX/PRO/DX INJ NEW DRUG ADDON: CPT

## 2024-07-23 PROCEDURE — 87808 TRICHOMONAS ASSAY W/OPTIC: CPT

## 2024-07-23 PROCEDURE — 74177 CT ABD & PELVIS W/CONTRAST: CPT

## 2024-07-23 PROCEDURE — 85025 COMPLETE CBC W/AUTO DIFF WBC: CPT

## 2024-07-23 PROCEDURE — 96374 THER/PROPH/DIAG INJ IV PUSH: CPT

## 2024-07-23 PROCEDURE — 6360000002 HC RX W HCPCS: Performed by: EMERGENCY MEDICINE

## 2024-07-23 PROCEDURE — 6360000004 HC RX CONTRAST MEDICATION: Performed by: EMERGENCY MEDICINE

## 2024-07-23 PROCEDURE — 99285 EMERGENCY DEPT VISIT HI MDM: CPT

## 2024-07-23 PROCEDURE — 36415 COLL VENOUS BLD VENIPUNCTURE: CPT

## 2024-07-23 PROCEDURE — 93975 VASCULAR STUDY: CPT

## 2024-07-23 PROCEDURE — 87210 SMEAR WET MOUNT SALINE/INK: CPT

## 2024-07-23 PROCEDURE — 76830 TRANSVAGINAL US NON-OB: CPT

## 2024-07-23 PROCEDURE — 80053 COMPREHEN METABOLIC PANEL: CPT

## 2024-07-23 PROCEDURE — 2580000003 HC RX 258: Performed by: EMERGENCY MEDICINE

## 2024-07-23 PROCEDURE — 81003 URINALYSIS AUTO W/O SCOPE: CPT

## 2024-07-23 RX ORDER — KETOROLAC TROMETHAMINE 10 MG/1
10 TABLET, FILM COATED ORAL EVERY 6 HOURS PRN
Qty: 20 TABLET | Refills: 0 | Status: SHIPPED | OUTPATIENT
Start: 2024-07-23

## 2024-07-23 RX ORDER — ONDANSETRON 2 MG/ML
4 INJECTION INTRAMUSCULAR; INTRAVENOUS ONCE
Status: COMPLETED | OUTPATIENT
Start: 2024-07-23 | End: 2024-07-23

## 2024-07-23 RX ORDER — 0.9 % SODIUM CHLORIDE 0.9 %
1000 INTRAVENOUS SOLUTION INTRAVENOUS ONCE
Status: COMPLETED | OUTPATIENT
Start: 2024-07-23 | End: 2024-07-23

## 2024-07-23 RX ORDER — KETOROLAC TROMETHAMINE 15 MG/ML
15 INJECTION, SOLUTION INTRAMUSCULAR; INTRAVENOUS ONCE
Status: DISCONTINUED | OUTPATIENT
Start: 2024-07-23 | End: 2024-07-23 | Stop reason: HOSPADM

## 2024-07-23 RX ORDER — METRONIDAZOLE 500 MG/1
500 TABLET ORAL 2 TIMES DAILY
Qty: 14 TABLET | Refills: 0 | Status: SHIPPED | OUTPATIENT
Start: 2024-07-23 | End: 2024-07-30

## 2024-07-23 RX ORDER — MORPHINE SULFATE 4 MG/ML
4 INJECTION, SOLUTION INTRAMUSCULAR; INTRAVENOUS ONCE
Status: COMPLETED | OUTPATIENT
Start: 2024-07-23 | End: 2024-07-23

## 2024-07-23 RX ADMIN — SODIUM CHLORIDE 1000 ML: 9 INJECTION, SOLUTION INTRAVENOUS at 02:34

## 2024-07-23 RX ADMIN — MORPHINE SULFATE 4 MG: 4 INJECTION, SOLUTION INTRAMUSCULAR; INTRAVENOUS at 02:35

## 2024-07-23 RX ADMIN — ONDANSETRON 4 MG: 2 INJECTION INTRAMUSCULAR; INTRAVENOUS at 02:35

## 2024-07-23 RX ADMIN — IOPAMIDOL 75 ML: 755 INJECTION, SOLUTION INTRAVENOUS at 03:50

## 2024-07-23 ASSESSMENT — ENCOUNTER SYMPTOMS
SHORTNESS OF BREATH: 0
VOMITING: 0

## 2024-07-23 NOTE — ED PROVIDER NOTES
[facility] ED  EMERGENCY DEPARTMENT ENCOUNTER      Pt Name: Jessy Arnett  MRN: [id number]  Birthdate 2000  Date of evaluation: 7/23/2024  Provider: Enrique Miguel MD  5:43 AM    CHIEF COMPLAINT       Chief Complaint   Patient presents with    Abdominal Pain     Lower abdominal pain         HISTORY OF PRESENT ILLNESS    Jessy Arnett is a 24 y.o. female who presents to the emergency department via private vehicle for evaluation of lower abdominal pain.  She states that she gets cramping quite frequently.  That she was feeling fine earlier in the day in the evening.  She just had sexual intercourse with her partner and at the end of that she began to have lower abdominal/pelvic sharp pain, more on the right and in the suprapubic area.  Associated nausea.  No fever, syncope, neck or jaw pain, chest pain or difficulty breathing, upper abdominal pain, flank pain, vaginal discharge.    HPI  Chart review notes history of ADHD, tonsillectomy  Nursing Notes were reviewed.    REVIEW OF SYSTEMS       Review of Systems   Constitutional:  Negative for fever.        Abdominal pelvic pain after intercourse   Respiratory:  Negative for shortness of breath.    Cardiovascular:  Negative for chest pain.   Gastrointestinal:  Negative for vomiting.   Genitourinary:  Negative for dysuria, flank pain, genital sores, vaginal bleeding and vaginal discharge.   Musculoskeletal:  Negative for neck pain.   Neurological:  Negative for syncope.   All other systems reviewed and are negative.      Except as noted above the remainder of the review of systems was reviewed and negative.       PAST MEDICAL HISTORY   History reviewed. No pertinent past medical history.      SURGICAL HISTORY       Past Surgical History:   Procedure Laterality Date    INNER EAR SURGERY      TONSILLECTOMY AND ADENOIDECTOMY      TYMPANOSTOMY TUBE PLACEMENT      WISDOM TOOTH EXTRACTION           CURRENT MEDICATIONS       Previous Medications

## 2024-07-23 NOTE — ED TRIAGE NOTES
Patient to ED due to lower abdominal pain and cramping. Patient states pain started after intercourse. Patient is alert and oriented x4. Ambulatory to triage.

## 2024-07-31 ENCOUNTER — OFFICE VISIT (OUTPATIENT)
Dept: OBGYN CLINIC | Age: 24
End: 2024-07-31
Payer: COMMERCIAL

## 2024-07-31 VITALS
DIASTOLIC BLOOD PRESSURE: 72 MMHG | WEIGHT: 120 LBS | BODY MASS INDEX: 18.25 KG/M2 | HEART RATE: 93 BPM | SYSTOLIC BLOOD PRESSURE: 102 MMHG

## 2024-07-31 DIAGNOSIS — R10.2 PELVIC PAIN: Primary | ICD-10-CM

## 2024-07-31 DIAGNOSIS — N83.201 RIGHT OVARIAN CYST: ICD-10-CM

## 2024-07-31 PROCEDURE — 99213 OFFICE O/P EST LOW 20 MIN: CPT | Performed by: ADVANCED PRACTICE MIDWIFE

## 2024-07-31 RX ORDER — PRAZOSIN HYDROCHLORIDE 2 MG/1
2 CAPSULE ORAL NIGHTLY
COMMUNITY
Start: 2024-05-26

## 2024-07-31 RX ORDER — ESCITALOPRAM OXALATE 10 MG/1
10 TABLET ORAL DAILY
COMMUNITY
Start: 2024-05-26

## 2024-07-31 ASSESSMENT — ENCOUNTER SYMPTOMS
NAUSEA: 0
ABDOMINAL PAIN: 0
CONSTIPATION: 0
VOMITING: 0
DIARRHEA: 0
COUGH: 0
SHORTNESS OF BREATH: 0

## 2024-07-31 NOTE — PROGRESS NOTES
SUBJECTIVE:  Jessy Arnett is a 24 y.o. female who presents here today for complaints of:      Chief Complaint   Patient presents with    ED Follow-up     Was seen at ED 7/23 , she was having some pelvic pains after recent intercourse and had some ovarian cysts, the pelvic pains have improved      Pelvic Pain, Right Ovarian Cyst  Presented to the ER on 7/23/24 following onset of severe right lower pelvic pain.  While in the ER pelvic imaging was done which identified a right ovarian cyst.  Pain has now fully resolved, feels like things have returned to normal.    Review of Systems   Respiratory:  Negative for cough and shortness of breath.    Gastrointestinal:  Negative for abdominal pain, constipation, diarrhea, nausea and vomiting.   Genitourinary:  Negative for difficulty urinating, dysuria, menstrual problem, pelvic pain, vaginal bleeding and vaginal discharge.   All other systems reviewed and are negative.    OBJECTIVE:  Vitals:  /72   Pulse 93   Wt 54.4 kg (120 lb)   BMI 18.25 kg/m²     Physical Exam  Appearance:  Normal appearance  Cardiovascular:  Normal rate, Capillary refill less than 2 seconds  Pulmonary:  Normal effort, no distress  Abdominal:  No tenderness  MS:  No Swelling, No dependent edema  Skin:  Warm, dry  Neuro:  Alert and oriented x3, reflexes normal.  Psychiatric:  Normal mood and behavior    ASSESSMENT & PLAN:   Diagnosis Orders   1. Pelvic pain        2. Right ovarian cyst  US NON OB TRANSVAGINAL    US PELVIS COMPLETE    US DUP ABD PEL RETRO SCROT COMPLETE          Pelvic Pain, Right Ovarian Cyst  Follow-up US in 3 months to ensure ovarian cyst has resolved    Return for discuss pelvic US results, (virtual visit).    MACKENZIE Myers CNM

## 2024-08-12 RX ORDER — ESCITALOPRAM OXALATE 10 MG/1
10 TABLET ORAL DAILY
Qty: 30 TABLET | Refills: 0 | Status: SHIPPED | OUTPATIENT
Start: 2024-08-12 | End: 2024-09-11

## 2024-08-12 NOTE — TELEPHONE ENCOUNTER
Requesting medication refill. Please approve or deny this request.    Rx requested:  Requested Prescriptions     Pending Prescriptions Disp Refills    escitalopram (LEXAPRO) 10 MG tablet 90 tablet 0     Sig: Take 1 tablet by mouth daily         Last Office Visit:   5/20/2024      Next Visit Date:  Future Appointments   Date Time Provider Department Center   8/20/2024  2:15 PM Thu Jay, APRN - CNP [facility] DEP

## 2024-08-29 ENCOUNTER — OFFICE VISIT (OUTPATIENT)
Dept: FAMILY MEDICINE CLINIC | Age: 24
End: 2024-08-29

## 2024-08-29 VITALS
SYSTOLIC BLOOD PRESSURE: 112 MMHG | WEIGHT: 117 LBS | DIASTOLIC BLOOD PRESSURE: 62 MMHG | HEIGHT: 68 IN | HEART RATE: 86 BPM | BODY MASS INDEX: 17.73 KG/M2

## 2024-08-29 DIAGNOSIS — F90.2 ATTENTION DEFICIT HYPERACTIVITY DISORDER (ADHD), COMBINED TYPE: ICD-10-CM

## 2024-08-29 DIAGNOSIS — Z00.00 ANNUAL PHYSICAL EXAM: Primary | ICD-10-CM

## 2024-08-29 DIAGNOSIS — Z00.00 ANNUAL PHYSICAL EXAM: ICD-10-CM

## 2024-08-29 DIAGNOSIS — F41.1 GAD (GENERALIZED ANXIETY DISORDER): ICD-10-CM

## 2024-08-29 LAB
ALBUMIN SERPL-MCNC: 4.5 G/DL (ref 3.5–4.6)
ALP SERPL-CCNC: 57 U/L (ref 40–130)
ALT SERPL-CCNC: <5 U/L (ref 0–33)
ANION GAP SERPL CALCULATED.3IONS-SCNC: 10 MEQ/L (ref 9–15)
AST SERPL-CCNC: 15 U/L (ref 0–35)
BASOPHILS # BLD: 0 K/UL (ref 0–0.2)
BASOPHILS NFR BLD: 0.6 %
BILIRUB SERPL-MCNC: 1.2 MG/DL (ref 0.2–0.7)
BUN SERPL-MCNC: 13 MG/DL (ref 6–20)
CALCIUM SERPL-MCNC: 9.3 MG/DL (ref 8.5–9.9)
CHLORIDE SERPL-SCNC: 103 MEQ/L (ref 95–107)
CHOLEST SERPL-MCNC: 122 MG/DL (ref 0–199)
CO2 SERPL-SCNC: 26 MEQ/L (ref 20–31)
CREAT SERPL-MCNC: 0.73 MG/DL (ref 0.5–0.9)
EOSINOPHIL # BLD: 0.1 K/UL (ref 0–0.7)
EOSINOPHIL NFR BLD: 0.9 %
ERYTHROCYTE [DISTWIDTH] IN BLOOD BY AUTOMATED COUNT: 12.2 % (ref 11.5–14.5)
GLOBULIN SER CALC-MCNC: 2.7 G/DL (ref 2.3–3.5)
GLUCOSE SERPL-MCNC: 82 MG/DL (ref 70–99)
HCT VFR BLD AUTO: 43.2 % (ref 37–47)
HDLC SERPL-MCNC: 45 MG/DL (ref 40–59)
HGB BLD-MCNC: 14.3 G/DL (ref 12–16)
LDLC SERPL CALC-MCNC: 68 MG/DL (ref 0–129)
LYMPHOCYTES # BLD: 1.9 K/UL (ref 1–4.8)
LYMPHOCYTES NFR BLD: 28.1 %
MCH RBC QN AUTO: 30.8 PG (ref 27–31.3)
MCHC RBC AUTO-ENTMCNC: 33.1 % (ref 33–37)
MCV RBC AUTO: 92.9 FL (ref 79.4–94.8)
MONOCYTES # BLD: 0.5 K/UL (ref 0.2–0.8)
MONOCYTES NFR BLD: 7.8 %
NEUTROPHILS # BLD: 4.1 K/UL (ref 1.4–6.5)
NEUTS SEG NFR BLD: 62.3 %
PLATELET # BLD AUTO: 307 K/UL (ref 130–400)
POTASSIUM SERPL-SCNC: 4.6 MEQ/L (ref 3.4–4.9)
PROT SERPL-MCNC: 7.2 G/DL (ref 6.3–8)
RBC # BLD AUTO: 4.65 M/UL (ref 4.2–5.4)
SODIUM SERPL-SCNC: 139 MEQ/L (ref 135–144)
TRIGL SERPL-MCNC: 45 MG/DL (ref 0–150)
WBC # BLD AUTO: 6.7 K/UL (ref 4.8–10.8)

## 2024-08-29 RX ORDER — DEXTROAMPHETAMINE SACCHARATE, AMPHETAMINE ASPARTATE MONOHYDRATE, DEXTROAMPHETAMINE SULFATE AND AMPHETAMINE SULFATE 2.5; 2.5; 2.5; 2.5 MG/1; MG/1; MG/1; MG/1
10 CAPSULE, EXTENDED RELEASE ORAL DAILY
Qty: 90 CAPSULE | Refills: 0 | Status: SHIPPED | OUTPATIENT
Start: 2024-08-29 | End: 2024-11-27

## 2024-08-29 RX ORDER — ESCITALOPRAM OXALATE 10 MG/1
10 TABLET ORAL DAILY
Qty: 90 TABLET | Refills: 0 | Status: SHIPPED | OUTPATIENT
Start: 2024-08-29 | End: 2024-11-27

## 2024-08-31 LAB
6MAM UR QL: NOT DETECTED
7-AMINOCLONAZEPAM: NOT DETECTED
ALPHA-OH-ALPRAZOLAM: NOT DETECTED
ALPHA-OH-MIDAZOLAM, URINE: NOT DETECTED
ALPRAZOLAM: NOT DETECTED
AMPHET UR QL SCN: PRESENT
BARBITURATES: NEGATIVE
BENZOYLECGONINE: NEGATIVE
BUPRENORPHINE: NOT DETECTED
CARISOPRODOL UR QL: NEGATIVE
CLONAZEPAM UR QL: NOT DETECTED
CODEINE: NOT DETECTED
CREAT UR-MCNC: 259.7 MG/DL (ref 20–400)
DIAZEPAM: NOT DETECTED
ETHYL GLUCURONIDE: NEGATIVE
FENTANYL UR QL: NOT DETECTED
GABAPENTIN: NOT DETECTED
HYDROCODONE UR QL: NOT DETECTED
HYDROMORPHONE: NOT DETECTED
LORAZEPAM UR QL: NOT DETECTED
MARIJUANA METABOLITE: NORMAL
MDA: NOT DETECTED
MDEA: NOT DETECTED
MDMA UR QL: NOT DETECTED
MEPERIDINE: NOT DETECTED
METHADONE: NEGATIVE
METHAMPHETAMINE: NOT DETECTED
METHYLPHENIDATE: NOT DETECTED
MIDAZOLAM UR QL SCN: NOT DETECTED
MORPHINE: NOT DETECTED
NALOXONE: NOT DETECTED
NORBUPRENORPHINE, FREE: NOT DETECTED
NORDIAZEPAM: NOT DETECTED
NORFENTANYL: NOT DETECTED
NORHYDROCODONE, URINE: NOT DETECTED
NOROXYCODONE: NOT DETECTED
NOROXYMORPHONE, URINE: NOT DETECTED
OXAZEPAM UR QL: NOT DETECTED
OXYCODONE UR QL: NOT DETECTED
OXYMORPHONE UR QL: NOT DETECTED
PAIN MANAGEMENT DRUG PANEL: NORMAL
PATHOLOGY STUDY: NORMAL
PCP: NEGATIVE
PHENTERMINE: NOT DETECTED
PREGABALIN: NOT DETECTED
TAPENTADOL, URINE: NOT DETECTED
TAPENTADOL-O-SULFATE, URINE: NOT DETECTED
TEMAZEPAM: NOT DETECTED
TRAMADOL: NEGATIVE
ZOLPIDEM: NOT DETECTED

## 2024-09-05 ASSESSMENT — ENCOUNTER SYMPTOMS
VOICE CHANGE: 0
CONSTIPATION: 0
RECTAL PAIN: 0
NAUSEA: 0
COLOR CHANGE: 0
ANAL BLEEDING: 0
FEELING OF CHOKING: 0
SORE THROAT: 0
BLOOD IN STOOL: 0
TROUBLE SWALLOWING: 0
RESPIRATORY NEGATIVE: 1
ALLERGIC/IMMUNOLOGIC NEGATIVE: 1
EYES NEGATIVE: 1
HYPERVENTILATION: 0
SHORTNESS OF BREATH: 0
DIARRHEA: 0
CHANGE IN BOWEL HABIT: 0
SWOLLEN GLANDS: 0
COUGH: 0
VOMITING: 0
ABDOMINAL PAIN: 0
GASTROINTESTINAL NEGATIVE: 1
VISUAL CHANGE: 0

## 2024-09-06 NOTE — PROGRESS NOTES
Subjective  Jessy Arnett, 24 y.o. female presents today with:  Chief Complaint   Patient presents with    3 Month Follow-Up    ADHD        Annual exam- no concerns    Needs csm    ADHD  This is a chronic (Diagnosis by psych at [facility]-  see Media for verification) problem. The current episode started more than 1 year ago. The problem occurs constantly. The problem has been unchanged. Pertinent negatives include no abdominal pain, anorexia, arthralgias, change in bowel habit, chest pain, chills, congestion, coughing, diaphoresis, fatigue, fever, headaches, joint swelling, myalgias, nausea, neck pain, numbness, rash, sore throat, swollen glands, urinary symptoms, vertigo, visual change, vomiting or weakness. Nothing aggravates the symptoms. Treatments tried: CBT first, then no stim stratera, but made her not sleep, not eat didnt feel good.   Anxiety  Presents for follow-up visit. Patient reports no chest pain, compulsions, confusion, decreased concentration, depressed mood, dizziness, dry mouth, excessive worry, feeling of choking, hyperventilation, impotence, insomnia, irritability, malaise, muscle tension, nausea, nervous/anxious behavior, obsessions, palpitations, panic, restlessness, shortness of breath or suicidal ideas. Primary symptoms comment: nightmares obsessive thoughts- she does have triggers and PTSD. Symptoms occur rarely. Current severity: well controlled. The quality of sleep is fair. Nighttime awakenings: depends on the night.     Compliance with medications is % (lexapro 5 mg will increase to 10mg).         Review of Systems   Constitutional: Negative.  Negative for activity change, appetite change, chills, diaphoresis, fatigue, fever, irritability and unexpected weight change.   HENT: Negative.  Negative for congestion, dental problem, nosebleeds, sore throat, trouble swallowing and voice change.    Eyes: Negative.  Negative for visual disturbance.   Respiratory: Negative.  Negative for

## 2024-09-11 ENCOUNTER — TELEPHONE (OUTPATIENT)
Dept: FAMILY MEDICINE CLINIC | Age: 24
End: 2024-09-11

## 2024-09-11 DIAGNOSIS — J30.89 ENVIRONMENTAL AND SEASONAL ALLERGIES: Primary | ICD-10-CM

## 2024-09-11 RX ORDER — TRIAMCINOLONE ACETONIDE 40 MG/ML
40 INJECTION, SUSPENSION INTRA-ARTICULAR; INTRAMUSCULAR ONCE
Status: SHIPPED | OUTPATIENT
Start: 2024-09-12

## 2024-11-12 ENCOUNTER — HOSPITAL ENCOUNTER (OUTPATIENT)
Dept: ULTRASOUND IMAGING | Age: 24
Discharge: HOME | End: 2024-11-14
Payer: COMMERCIAL

## 2024-11-12 DIAGNOSIS — N83.201 RIGHT OVARIAN CYST: ICD-10-CM

## 2024-11-12 PROCEDURE — 93975 VASCULAR STUDY: CPT

## 2024-11-12 PROCEDURE — 76830 TRANSVAGINAL US NON-OB: CPT

## 2024-11-12 PROCEDURE — 76856 US EXAM PELVIC COMPLETE: CPT

## 2024-11-13 ENCOUNTER — OFFICE VISIT (OUTPATIENT)
Dept: OBGYN CLINIC | Age: 24
End: 2024-11-13
Payer: COMMERCIAL

## 2024-11-13 VITALS
HEART RATE: 63 BPM | DIASTOLIC BLOOD PRESSURE: 58 MMHG | BODY MASS INDEX: 18.4 KG/M2 | WEIGHT: 121 LBS | SYSTOLIC BLOOD PRESSURE: 116 MMHG

## 2024-11-13 DIAGNOSIS — R10.2 PELVIC PAIN: Primary | ICD-10-CM

## 2024-11-13 PROCEDURE — 99212 OFFICE O/P EST SF 10 MIN: CPT | Performed by: ADVANCED PRACTICE MIDWIFE

## 2024-11-13 ASSESSMENT — ENCOUNTER SYMPTOMS
SHORTNESS OF BREATH: 0
COUGH: 0
NAUSEA: 0
CONSTIPATION: 0
DIARRHEA: 0
ABDOMINAL PAIN: 0
VOMITING: 0

## 2024-11-13 NOTE — PROGRESS NOTES
baseline.   Psychiatric:         Mood and Affect: Mood normal.         Behavior: Behavior normal.       ASSESSMENT & PLAN:   Diagnosis Orders   1. Pelvic pain            Pelvic Pain  Follow-up as needed    Return if symptoms worsen or fail to improve.    MACKENZIE Myers CNM

## 2024-12-02 ENCOUNTER — OFFICE VISIT (OUTPATIENT)
Dept: FAMILY MEDICINE CLINIC | Age: 24
End: 2024-12-02
Payer: COMMERCIAL

## 2024-12-02 VITALS
WEIGHT: 119 LBS | HEIGHT: 68 IN | SYSTOLIC BLOOD PRESSURE: 120 MMHG | DIASTOLIC BLOOD PRESSURE: 70 MMHG | BODY MASS INDEX: 18.04 KG/M2

## 2024-12-02 DIAGNOSIS — F41.1 GAD (GENERALIZED ANXIETY DISORDER): ICD-10-CM

## 2024-12-02 DIAGNOSIS — J45.990 MILD EXERCISE-INDUCED ASTHMA: ICD-10-CM

## 2024-12-02 DIAGNOSIS — F90.2 ATTENTION DEFICIT HYPERACTIVITY DISORDER (ADHD), COMBINED TYPE: Primary | ICD-10-CM

## 2024-12-02 PROCEDURE — 99214 OFFICE O/P EST MOD 30 MIN: CPT | Performed by: NURSE PRACTITIONER

## 2024-12-02 RX ORDER — ALBUTEROL SULFATE 90 UG/1
2 INHALANT RESPIRATORY (INHALATION) 4 TIMES DAILY PRN
Qty: 18 G | Refills: 0 | Status: SHIPPED | OUTPATIENT
Start: 2024-12-02

## 2024-12-02 RX ORDER — DEXTROAMPHETAMINE SACCHARATE, AMPHETAMINE ASPARTATE MONOHYDRATE, DEXTROAMPHETAMINE SULFATE AND AMPHETAMINE SULFATE 2.5; 2.5; 2.5; 2.5 MG/1; MG/1; MG/1; MG/1
10 CAPSULE, EXTENDED RELEASE ORAL DAILY
Qty: 90 CAPSULE | Refills: 0 | Status: CANCELLED | OUTPATIENT
Start: 2024-12-02 | End: 2025-03-02

## 2024-12-02 RX ORDER — DEXTROAMPHETAMINE SACCHARATE, AMPHETAMINE ASPARTATE, DEXTROAMPHETAMINE SULFATE AND AMPHETAMINE SULFATE 2.5; 2.5; 2.5; 2.5 MG/1; MG/1; MG/1; MG/1
10 TABLET ORAL 2 TIMES DAILY
Qty: 180 TABLET | Refills: 0 | Status: SHIPPED | OUTPATIENT
Start: 2024-12-02 | End: 2025-03-02

## 2024-12-02 RX ORDER — ESCITALOPRAM OXALATE 10 MG/1
10 TABLET ORAL DAILY
Qty: 90 TABLET | Refills: 3 | Status: SHIPPED | OUTPATIENT
Start: 2024-12-02 | End: 2025-03-02

## 2024-12-02 ASSESSMENT — ENCOUNTER SYMPTOMS
GASTROINTESTINAL NEGATIVE: 1
VOICE CHANGE: 0
RESPIRATORY NEGATIVE: 1
ALLERGIC/IMMUNOLOGIC NEGATIVE: 1
COLOR CHANGE: 0
CONSTIPATION: 0
DIARRHEA: 0
ABDOMINAL PAIN: 0
SHORTNESS OF BREATH: 0
TROUBLE SWALLOWING: 0
ANAL BLEEDING: 0
BLOOD IN STOOL: 0
RECTAL PAIN: 0
EYES NEGATIVE: 1

## 2024-12-02 NOTE — PROGRESS NOTES
expresses understanding and desires to proceed.    Close follow up to evaluate treatment results and for coordination of care.  I have reviewed the patient's medical history in detail and updated the computerized patient record.    Please note, this report has been partially produced using speech recognition software  and may cause  and /or contain errors related to that system including grammar, punctuation and spelling as well as words and phrases that may seem inappropriate. If there are questions or concerns please feel free to contact me to clarify.    On this date 12/2/2024 I have spent 35 minutes reviewing previous notes, test results and face to face with the patient discussing the diagnosis and importance of compliance with the treatment plan as well as documenting on the day of the visit.    The patient (or guardian, if applicable) and other individuals in attendance with the patient were advised that Artificial Intelligence will be utilized during this visit to record, process the conversation to generate a clinical note and to support improvement of the AI technology. The patient (or guardian, if applicable) and other individuals in attendance at the appointment consented to the use of AI, including the recording.      An electronic signature was used to authenticate this note.    --Thu Jay, APRN - CNP

## 2025-03-03 ENCOUNTER — OFFICE VISIT (OUTPATIENT)
Age: 25
End: 2025-03-03
Payer: COMMERCIAL

## 2025-03-03 VITALS
DIASTOLIC BLOOD PRESSURE: 62 MMHG | WEIGHT: 117 LBS | BODY MASS INDEX: 17.73 KG/M2 | HEIGHT: 68 IN | SYSTOLIC BLOOD PRESSURE: 120 MMHG

## 2025-03-03 DIAGNOSIS — F41.1 GAD (GENERALIZED ANXIETY DISORDER): ICD-10-CM

## 2025-03-03 DIAGNOSIS — S76.212A STRAIN OF LEFT GROIN: ICD-10-CM

## 2025-03-03 DIAGNOSIS — F90.2 ATTENTION DEFICIT HYPERACTIVITY DISORDER (ADHD), COMBINED TYPE: Primary | ICD-10-CM

## 2025-03-03 DIAGNOSIS — F33.42 RECURRENT MAJOR DEPRESSIVE DISORDER, IN FULL REMISSION: ICD-10-CM

## 2025-03-03 PROCEDURE — 99214 OFFICE O/P EST MOD 30 MIN: CPT | Performed by: NURSE PRACTITIONER

## 2025-03-03 RX ORDER — ESCITALOPRAM OXALATE 10 MG/1
10 TABLET ORAL DAILY
Qty: 90 TABLET | Refills: 3 | Status: SHIPPED | OUTPATIENT
Start: 2025-03-03 | End: 2025-06-01

## 2025-03-03 RX ORDER — DEXTROAMPHETAMINE SACCHARATE, AMPHETAMINE ASPARTATE, DEXTROAMPHETAMINE SULFATE AND AMPHETAMINE SULFATE 2.5; 2.5; 2.5; 2.5 MG/1; MG/1; MG/1; MG/1
10 TABLET ORAL 2 TIMES DAILY
Qty: 180 TABLET | Refills: 0 | Status: SHIPPED | OUTPATIENT
Start: 2025-03-03 | End: 2025-06-01

## 2025-03-03 SDOH — ECONOMIC STABILITY: FOOD INSECURITY: WITHIN THE PAST 12 MONTHS, YOU WORRIED THAT YOUR FOOD WOULD RUN OUT BEFORE YOU GOT MONEY TO BUY MORE.: NEVER TRUE

## 2025-03-03 SDOH — ECONOMIC STABILITY: INCOME INSECURITY: IN THE LAST 12 MONTHS, WAS THERE A TIME WHEN YOU WERE NOT ABLE TO PAY THE MORTGAGE OR RENT ON TIME?: NO

## 2025-03-03 SDOH — ECONOMIC STABILITY: FOOD INSECURITY: WITHIN THE PAST 12 MONTHS, THE FOOD YOU BOUGHT JUST DIDN'T LAST AND YOU DIDN'T HAVE MONEY TO GET MORE.: NEVER TRUE

## 2025-03-03 SDOH — ECONOMIC STABILITY: TRANSPORTATION INSECURITY
IN THE PAST 12 MONTHS, HAS THE LACK OF TRANSPORTATION KEPT YOU FROM MEDICAL APPOINTMENTS OR FROM GETTING MEDICATIONS?: NO

## 2025-03-03 SDOH — ECONOMIC STABILITY: TRANSPORTATION INSECURITY
IN THE PAST 12 MONTHS, HAS LACK OF TRANSPORTATION KEPT YOU FROM MEETINGS, WORK, OR FROM GETTING THINGS NEEDED FOR DAILY LIVING?: NO

## 2025-03-03 ASSESSMENT — PATIENT HEALTH QUESTIONNAIRE - PHQ9
1. LITTLE INTEREST OR PLEASURE IN DOING THINGS: SEVERAL DAYS
SUM OF ALL RESPONSES TO PHQ QUESTIONS 1-9: 2
SUM OF ALL RESPONSES TO PHQ9 QUESTIONS 1 & 2: 2
2. FEELING DOWN, DEPRESSED OR HOPELESS: SEVERAL DAYS
SUM OF ALL RESPONSES TO PHQ QUESTIONS 1-9: 2
SUM OF ALL RESPONSES TO PHQ QUESTIONS 1-9: 2
1. LITTLE INTEREST OR PLEASURE IN DOING THINGS: SEVERAL DAYS
SUM OF ALL RESPONSES TO PHQ QUESTIONS 1-9: 2
2. FEELING DOWN, DEPRESSED OR HOPELESS: SEVERAL DAYS

## 2025-03-10 ENCOUNTER — HOSPITAL ENCOUNTER (OUTPATIENT)
Dept: PHYSICAL THERAPY | Age: 25
Setting detail: THERAPIES SERIES
Discharge: HOME | End: 2025-03-10
Payer: COMMERCIAL

## 2025-03-10 PROCEDURE — 97161 PT EVAL LOW COMPLEX 20 MIN: CPT

## 2025-03-10 PROCEDURE — 97110 THERAPEUTIC EXERCISES: CPT

## 2025-03-10 NOTE — PLAN OF CARE
Physical Therapy Evaluation/Plan of Care   [facility] OUT PATIENT THERAPY AND REHABILITATION  [address]  [facility] [address]  Dept: [phone]  Dept Fax: [phone]  Loc: [phone]    Physical Therapy: Initial Evaluation    General Information    Patient: Jessy Arnett (24 y.o.     female)   Examination Date: 03/10/2025   :  2000 ;    Confirmed: Yes MRN: [id number]  CSN: [id number]   Insurance: Payor: [facility] / Plan: [facility] EM[employer]OYEES / Product Type: *No Product type* /   Insurance ID: [id number] - (Commercial)  PT Insurance Information: [facility] Secondary Insurance (if applicable):     Referring Physician: Thu Jay APRN - CNP       Visits to Date/Visits Approved:     No Show/Cancelled Appts: 0  0     Medical Diagnosis: Strain of left groin [S76.212A] strain Lt groin  Diagnosis: strain Lt groin   Treatment Diagnosis: Lt groin pain, decreased Lt LE flexibility, decreased core strength      SUBJECTIVE:     Onset date:   Onset Date:  (1.5 years ago)    Subjective/ Mechanism of Injury:   Subjective: Pt reports onset of symptoms 1.5 years ago. Noticed increased discomfort with cartwheels, yoga. Pain initiated Rt groin, but radiates to medial aspect of knee. occ tingling, but not sig. c/o some weakness. Denies recent falls. Relief with tiger balm, ice/ heat, massage, stretching, avoiding aggravating activities. Pt reports increased stiffness today in neck and shoulders due to stress levels.    Precautions/Contraindications/Restrictions: none           Changes in Bowel/Bladder Function: no  Saddle Anesthesia: no  Unexplained Weight Loss: no  Unrelenting Night Pain:  night sweats, bad dreams  Sudden Weakness/Falls: no    Interventions for current problem: ice, heat , tiger balm      Pain:   Current: 0/10   Best: 0/10   Worst:6/10 (occurs with yoga, cartwheels).  6/10 neck and shoulder pain due to stress.    Symptom Type / Quality: aching, sharp,

## 2025-03-12 ENCOUNTER — HOSPITAL ENCOUNTER (OUTPATIENT)
Dept: PHYSICAL THERAPY | Age: 25
Setting detail: THERAPIES SERIES
Discharge: HOME | End: 2025-03-12
Payer: COMMERCIAL

## 2025-03-12 PROCEDURE — 97110 THERAPEUTIC EXERCISES: CPT

## 2025-03-12 NOTE — PROGRESS NOTES
[facility]  Outpatient Physical Therapy    Treatment Note        Date: 3/12/2025  Patient: Jessy Arnett  : 2000   Confirmed: Yes  MRN: [id number]  Referring Provider: Thu Jay APR*   Secondary Referring Provider (If applicable):     Medical Diagnosis: Strain of left groin [S76.212A]    Treatment Diagnosis: Lt groin pain, decreased Lt LE flexibility, decreased core strength    Visit Information:  Insurance: Payor: R / Plan: [facility] EMPLOYEES / Product Type: *No Product type* /   PT Visit Information  PT Insurance Information: UMR  Total # of Visits Approved: 20  Total # of Visits to Date: 2  Progress Note Counter:     Subjective Information:  Subjective: Pt denies pain at arrival. Pt notes forgetting to mention on eval, frequent shin splint pain upon prolonged periods of walking [place] amusement park, festivals etc.  HEP Compliance:  [x] Good [] Fair [] Poor [] Reports not doing due to:    Pain Screening  Patient Currently in Pain: Denies    Treatment:  Exercises:  Exercises  Exercise 2: LTR (minimal to no stretch felt, pt demo's full range)  Exercise 3: hip adduction stretch, standing 15\"x3 Lt  Exercise 4: TFL stretch standing w/ 15\"x3  Exercise 5: hams str standing 3x30\" on stool  Exercise 6: 1/2 kneel hip flexor stretch 3x30\"  Exercise 7: Sci fit L2.0 5 min  Exercise 8: hip series: S/L circles x10 ea b/l  Exercise 9: Quadruped: firehydrants x10, hip exts x10 , bird dog x10, w/ 3-5\" iso hold  Exercise 10: * core strength  Exercise 20: HEP:  Treatment Reasoning  Limitations addressed: Mobility, Strength, Flexibility, Activity tolerance  Functional ability(s) targeted: Tolerance to age appropriate activities    Objective Measures:     Assessment:   Body Structures, Functions, Activity Limitations Requiring Skilled Therapeutic Intervention: Decreased ROM, Decreased strength, Decreased coordination, Decreased posture, Increased pain  Assessment: Initiated PT program

## 2025-03-17 ENCOUNTER — HOSPITAL ENCOUNTER (OUTPATIENT)
Dept: PHYSICAL THERAPY | Age: 25
Setting detail: THERAPIES SERIES
Discharge: HOME | End: 2025-03-17
Payer: COMMERCIAL

## 2025-03-17 PROCEDURE — 97110 THERAPEUTIC EXERCISES: CPT

## 2025-03-17 ASSESSMENT — PAIN DESCRIPTION - ORIENTATION: ORIENTATION: LEFT

## 2025-03-17 ASSESSMENT — PAIN SCALES - GENERAL: PAINLEVEL_OUTOF10: 2

## 2025-03-17 NOTE — PROGRESS NOTES
[facility]  Outpatient Physical Therapy    Treatment Note        Date: 3/17/2025  Patient: Jessy Arnett  : 2000   Confirmed: Yes  MRN: [id number]  Referring Provider: Thu Jay APR*   Secondary Referring Provider (If applicable):     Medical Diagnosis: Strain of left groin [S76.212A]    Treatment Diagnosis: Lt groin pain, decreased Lt LE flexibility, decreased core strength    Visit Information:  Insurance: Payor: R / Plan: [facility] EMPLOYEES / Product Type: *No Product type* /   PT Visit Information  PT Insurance Information: UMR  Total # of Visits Approved: 20  Total # of Visits to Date: 3  Progress Note Counter: 3/12    Subjective Information:  Subjective: Pt states lots of walking over weekend with minimal soreness.  HEP Compliance:  [x] Good [] Fair [] Poor [] Reports not doing due to:    Pain Screening  Patient Currently in Pain: Yes  Pain Assessment: 0-10  Pain Level: 2  Pain Location:  (groin)  Pain Orientation: Left    Treatment:  Exercises:  Exercises  Exercise 3: hip adduction stretch 1/2 kneel 15\"x3 Lt  Exercise 4: hip flexor stretch 1/2 kneel w/ 15\"x3  Exercise 5: hams str standing 3x30\" on stool  Exercise 6: 1/2 kneel hip flexor stretch 3x30\"  Exercise 8: hip series: S/L circles x10 ea b/l  Exercise 9: Quadruped: firehydrants x10, hip exts (knee ext and knee bent x10 , bird dog x10, w/ 3-5\" iso hold  Exercise 10: core strength: anti rotation walkouts GTB 5 sec/5, anti rotation press x10  Exercise 20: HEP:  Treatment Reasoning  Limitations addressed: Mobility, Strength, Flexibility, Activity tolerance  Functional ability(s) targeted: Tolerance to age appropriate activities    Manual:   Manual Therapy  Muscle Energy: * contract/ relax hams  Manual Traction: * leg pull  Soft Tissue Mobilizaton: foam roller TFL, hams, adductor  Other: * consider IDN TFL and adductor  Treatment Reasoning  Limitations addressed: Tissue extensibility, Painful

## 2025-03-19 ENCOUNTER — HOSPITAL ENCOUNTER (OUTPATIENT)
Dept: PHYSICAL THERAPY | Age: 25
Setting detail: THERAPIES SERIES
Discharge: HOME | End: 2025-03-19
Payer: COMMERCIAL

## 2025-03-19 PROCEDURE — 97110 THERAPEUTIC EXERCISES: CPT

## 2025-03-19 NOTE — PROGRESS NOTES
[facility]  Outpatient Physical Therapy    Treatment Note        Date: 3/19/2025  Patient: Jessy Arnett  : 2000   Confirmed: Yes  MRN: [id number]  Referring Provider: Thu Jay APR*   Secondary Referring Provider (If applicable):     Medical Diagnosis: Strain of left groin [S76.212A]    Treatment Diagnosis: Lt groin pain, decreased Lt LE flexibility, decreased core strength    Visit Information:  Insurance: Payor: R / Plan: [employer] EMPLOYEES / Product Type: *No Product type* /   PT Visit Information  PT Insurance Information: UMR  Total # of Visits Approved: 20  Total # of Visits to Date: 4  Progress Note Counter:     Subjective Information:  Subjective: Pt states lots of walking over weekend with minimal soreness.  HEP Compliance:  [x] Good [] Fair [] Poor [] Reports not doing due to:    Pain Screening  Patient Currently in Pain: Denies    Treatment:  Exercises:  Exercises  Exercise 2: standing hip abd/add w/ YTB x10  Exercise 3: hip adduction stretch 1/2 kneel 15\"x3 Lt, 1/2 kneel with trunk rotation 3/30 sec  Exercise 6: 1/2 kneel hip flexor stretch 3x30\"  Exercise 7: NuStep L3x5 min LE only  Exercise 9: Quadruped: firehydrants x10 w/ YTB, bird dog 5 sec/10  Exercise 10: core strength: anti rotation walkouts GTB 5 sec/5, anti rotation press x10  Exercise 20: HEP:  Treatment Reasoning  Limitations addressed: Mobility, Strength, Flexibility, Activity tolerance  Functional ability(s) targeted: Tolerance to age appropriate activities      *Indicates exercise, modality, or manual techniques to be initiated when appropriate    Objective Measures:        STG 1 Current Status:: 3/19- compliant with HEP     Assessment:   Body Structures, Functions, Activity Limitations Requiring Skilled Therapeutic Intervention: Decreased ROM, Decreased strength, Decreased coordination, Decreased posture, Increased pain  Assessment: Pt continues to report no pain, pt stating has not

## 2025-03-26 ENCOUNTER — HOSPITAL ENCOUNTER (OUTPATIENT)
Dept: PHYSICAL THERAPY | Age: 25
Setting detail: THERAPIES SERIES
Discharge: HOME | End: 2025-03-26
Payer: COMMERCIAL

## 2025-03-26 PROCEDURE — 97110 THERAPEUTIC EXERCISES: CPT

## 2025-03-26 NOTE — PROGRESS NOTES
[facility]  Outpatient Physical Therapy    Treatment Note        Date: 3/26/2025  Patient: Jessy Arnett  : 2000   Confirmed: Yes  MRN: [id number]  Referring Provider: Thu Jay APR*   Secondary Referring Provider (If applicable):     Medical Diagnosis: Strain of left groin [S76.212A]    Treatment Diagnosis: Lt groin pain, decreased Lt LE flexibility, decreased core strength    Visit Information:  Insurance: Payor: R / Plan: [facility] EMPLOYEES / Product Type: *No Product type* /   PT Visit Information  PT Insurance Information: UMR  Total # of Visits Approved: 20  Total # of Visits to Date: 5  Progress Note Counter:     Subjective Information:  Subjective: Pt states getting back from [place] with LB and abdominal soreness.  pt states doing alot of dancing and cartwheels that could have caused it.  Pt states no groin pain  HEP Compliance:  [x] Good [] Fair [] Poor [] Reports not doing due to:    Pain Screening  Patient Currently in Pain: Denies    Treatment:  Exercises:  Exercises  Exercise 2: standing hip abd/add w/ YTB x10  Exercise 6: standing kneel hip flexor stretch 3x30\"  Exercise 7: NuStep L4x5 min LE only  Exercise 8: objective measures taken  Exercise 9: Quadruped: bird dog 3 sec/10 w/ YTB, plank bird dog 5 sec/3  Exercise 10: core strength: trunk rotation w/ GTB x10  Exercise 20: HEP:  Treatment Reasoning  Limitations addressed: Mobility, Strength, Flexibility, Activity tolerance  Functional ability(s) targeted: Tolerance to age appropriate activities      *Indicates exercise, modality, or manual techniques to be initiated when appropriate    Objective Measures:      Strength RLE  R Hip Flexion: 5/5  R Hip Extension: 5/5  R Hip ABduction: 5/5  R Hip ADduction: 5/5  R Hip Internal Rotation: 4+/5  R Hip External Rotation: 5/5  R Knee Flexion: 5/5  R Knee Extension: 5/5  R Ankle Dorsiflexion: 5/5  Strength LLE  L Hip Flexion: 4+/5  L Hip Extension: 4+/5  L

## 2025-03-26 NOTE — PROGRESS NOTES
PHYSICAL THERAPY PLAN OF CARE   [facility]      [address], [address]   [place] [address]     Ph: [phone] Fax: [phone]      [] Certification  [] Recertification []  Plan of Care  [] Progress Note [x] Discharge      Referring Provider: Thu Jay APR*      From:  Abby Orlando, PT  Patient: Jessy Arnett (24 y.o. female) : 2000 Date: 2025   Medical Diagnosis: Strain of left groin [S76.212A]    Treatment Diagnosis: Lt groin pain, decreased Lt LE flexibility, decreased core strength    Progress Report Period from: 3/10/2025  to 3/26/2025    Visits to Date: 5 No Show:  0 Cancelled Appts:  0    OBJECTIVE:   Short Term Goals - Time Frame for Short Term Goals: 3 wks    Goals Current/Discharge status  Status   Short Term Goal 1: Independent with HEP to promote home management of symptoms  STG 1 Current Status:: 3/26- compliant with HEP   Met   Short Term Goal 2: Report 25% reduction in symptoms with improved tolerance to yoga  STG 2 Current Status:: 3/26- pt reports 100% reduction in symptoms with ability to perform yoga without any symptoms   Met     Long Term Goals - Time Frame for Long Term Goals : 5 wks  Goals Current/ Discharge status Status   Long Term Goal 1: Improve bilateral LE strength 5/5 to return to previous activities without modifications or pain LTG 1 Current Status:: 3/26- see strength section  Strength RLE  R Hip Flexion: 5/5  R Hip Extension: 5/5  R Hip ABduction: 5/5  R Hip ADduction: 5/5  R Hip Internal Rotation: 4+/5  R Hip External Rotation: 5/5  R Knee Flexion: 5/5  R Knee Extension: 5/5  R Ankle Dorsiflexion: 5/5  Strength LLE  L Hip Flexion: 4+/5  L Hip Extension: 4+/5  L Hip ABduction: 4+/5  L Hip ADduction: 4+/5  L Hip Internal Rotation: 4+/5  L Hip External Rotation: 5/5  L Knee Flexion: 5/5  L Knee Extension: 5/5  L Ankle Dorsiflexion: 5/5   Partially met   Long Term Goal 2: Improve bilateral SLR >/= 70 to improve tolerance to

## 2025-03-31 ENCOUNTER — APPOINTMENT (OUTPATIENT)
Dept: PHYSICAL THERAPY | Age: 25
End: 2025-03-31
Payer: COMMERCIAL

## 2025-04-28 DIAGNOSIS — J30.89 ENVIRONMENTAL AND SEASONAL ALLERGIES: ICD-10-CM

## 2025-04-28 RX ORDER — MONTELUKAST SODIUM 10 MG/1
10 TABLET ORAL NIGHTLY
Qty: 90 TABLET | Refills: 3 | Status: CANCELLED | OUTPATIENT
Start: 2025-04-28

## 2025-04-28 NOTE — TELEPHONE ENCOUNTER
[facility] calling in requesting singulair at patient request (on list cleanup)  They also faxed over request on 4/9 and 4/14    LOV 3/3/25  Next office visit 6/3/25    [facility] confirmed    Phone [phone]

## 2025-05-05 ENCOUNTER — PATIENT MESSAGE (OUTPATIENT)
Age: 25
End: 2025-05-05

## 2025-05-05 NOTE — TELEPHONE ENCOUNTER
Called patient and left VM to call office to schedule an appointment, or suggested to go to walkin.  Last seen 3/2025

## 2025-06-03 ENCOUNTER — OFFICE VISIT (OUTPATIENT)
Age: 25
End: 2025-06-03
Payer: COMMERCIAL

## 2025-06-03 VITALS
BODY MASS INDEX: 18.49 KG/M2 | HEIGHT: 68 IN | WEIGHT: 122 LBS | SYSTOLIC BLOOD PRESSURE: 120 MMHG | DIASTOLIC BLOOD PRESSURE: 70 MMHG

## 2025-06-03 DIAGNOSIS — S86.892A SHIN SPLINTS OF BOTH LOWER EXTREMITIES: ICD-10-CM

## 2025-06-03 DIAGNOSIS — S86.891A SHIN SPLINTS OF BOTH LOWER EXTREMITIES: ICD-10-CM

## 2025-06-03 DIAGNOSIS — F41.9 ANXIETY: ICD-10-CM

## 2025-06-03 DIAGNOSIS — J30.89 ENVIRONMENTAL AND SEASONAL ALLERGIES: ICD-10-CM

## 2025-06-03 DIAGNOSIS — F90.2 ATTENTION DEFICIT HYPERACTIVITY DISORDER (ADHD), COMBINED TYPE: Primary | ICD-10-CM

## 2025-06-03 PROCEDURE — 99214 OFFICE O/P EST MOD 30 MIN: CPT | Performed by: NURSE PRACTITIONER

## 2025-06-03 PROCEDURE — 96372 THER/PROPH/DIAG INJ SC/IM: CPT | Performed by: NURSE PRACTITIONER

## 2025-06-03 RX ORDER — DEXTROAMPHETAMINE SACCHARATE, AMPHETAMINE ASPARTATE, DEXTROAMPHETAMINE SULFATE AND AMPHETAMINE SULFATE 2.5; 2.5; 2.5; 2.5 MG/1; MG/1; MG/1; MG/1
10 TABLET ORAL DAILY
Qty: 180 TABLET | Refills: 0 | Status: SHIPPED | OUTPATIENT
Start: 2025-06-03 | End: 2025-06-05 | Stop reason: SDUPTHER

## 2025-06-03 RX ORDER — TRIAMCINOLONE ACETONIDE 40 MG/ML
40 INJECTION, SUSPENSION INTRA-ARTICULAR; INTRAMUSCULAR ONCE
Status: COMPLETED | OUTPATIENT
Start: 2025-06-03 | End: 2025-06-03

## 2025-06-03 RX ORDER — DEXTROAMPHETAMINE SACCHARATE, AMPHETAMINE ASPARTATE, DEXTROAMPHETAMINE SULFATE AND AMPHETAMINE SULFATE 2.5; 2.5; 2.5; 2.5 MG/1; MG/1; MG/1; MG/1
10 TABLET ORAL 2 TIMES DAILY
Qty: 180 TABLET | Refills: 0 | Status: CANCELLED | OUTPATIENT
Start: 2025-06-03 | End: 2025-09-01

## 2025-06-03 RX ORDER — MONTELUKAST SODIUM 10 MG/1
10 TABLET ORAL DAILY
Qty: 90 TABLET | Refills: 3 | Status: SHIPPED | OUTPATIENT
Start: 2025-06-03

## 2025-06-03 RX ADMIN — TRIAMCINOLONE ACETONIDE 40 MG: 40 INJECTION, SUSPENSION INTRA-ARTICULAR; INTRAMUSCULAR at 11:17

## 2025-06-03 NOTE — PROGRESS NOTES
Here for evaluation of the following chief complaint(s):  ADHD, Anxiety, and Depression    Chief Complaint   Patient presents with    ADHD    Anxiety    Depression     Subjective   History of Present Illness  The patient presents for evaluation of allergies, ADHD, anxiety, and shin splints.    She is currently on a regimen of six different allergy medications, which she reports as being overwhelming. Her symptoms typically manifest in March but have only become severe in the past two weeks. She has not consulted with an allergist. Her current treatment plan includes a nightly medication, daily Zyrtec in the morning, Flonase, a midday decongestant, and eye drops. She believes that the montelukast prescribed has been beneficial.    She is seeking refills for her ADHD medications. She reports that Adderall significantly improves her daily functioning.    She also reports experiencing severe shin splints and foot arch pain, which she attributes to her hiking activities. She has been using Dr. Bearden's shoes and tennis shoes with high arches for support. She is interested in exploring additional stretching exercises for relief.    Her anxiety symptoms have shown significant improvement with Lexapro.      Past Medical History:   Diagnosis Date    Asthma     Breast disorder 2022    “Lumpy breasts”    Herpes simplex virus (HSV) infection      Past Surgical History:   Procedure Laterality Date    INNER EAR SURGERY      TONSILLECTOMY AND ADENOIDECTOMY      TYMPANOSTOMY TUBE PLACEMENT      WISDOM TOOTH EXTRACTION       Social History     Socioeconomic History    Marital status:      Spouse name: Not on file    Number of children: Not on file    Years of education: Not on file    Highest education level: Not on file   Occupational History    Not on file   Tobacco Use    Smoking status: Former    Smokeless tobacco: Never   Substance and Sexual Activity    Alcohol use: Yes     Alcohol/week: 2.0 standard drinks of alcohol

## 2025-06-05 ENCOUNTER — TELEPHONE (OUTPATIENT)
Age: 25
End: 2025-06-05

## 2025-06-05 DIAGNOSIS — F90.2 ATTENTION DEFICIT HYPERACTIVITY DISORDER (ADHD), COMBINED TYPE: ICD-10-CM

## 2025-06-05 RX ORDER — DEXTROAMPHETAMINE SACCHARATE, AMPHETAMINE ASPARTATE, DEXTROAMPHETAMINE SULFATE AND AMPHETAMINE SULFATE 2.5; 2.5; 2.5; 2.5 MG/1; MG/1; MG/1; MG/1
10 TABLET ORAL 2 TIMES DAILY
Qty: 180 TABLET | Refills: 0 | Status: SHIPPED | OUTPATIENT
Start: 2025-06-05 | End: 2025-09-03

## 2025-06-05 NOTE — TELEPHONE ENCOUNTER
No Protocol     Requesting: allopurinol 100mg     LOV: 4/29/21   RTC: 6 months   Filled: 6/1/21 #90 0 refill   Recent Labs: 4/29/21     Upcoming OV: none scheduled Rx sent as daily needs to be bid rx pending

## 2025-06-05 NOTE — TELEPHONE ENCOUNTER
Anna from [facility] called office to clarify directions for Rx for Adderall 10mg sent 6/3/25.    Pharm states directions are not matching qty from previous Rx and pharm is asking provider to correct Rx and resend or cb w/ verbal response to match directions and qty.